# Patient Record
Sex: FEMALE | Race: WHITE | Employment: UNEMPLOYED | URBAN - METROPOLITAN AREA
[De-identification: names, ages, dates, MRNs, and addresses within clinical notes are randomized per-mention and may not be internally consistent; named-entity substitution may affect disease eponyms.]

---

## 2017-03-09 ENCOUNTER — OFFICE VISIT (OUTPATIENT)
Dept: FAMILY MEDICINE CLINIC | Age: 27
End: 2017-03-09

## 2017-03-09 VITALS
BODY MASS INDEX: 35.03 KG/M2 | WEIGHT: 218 LBS | RESPIRATION RATE: 16 BRPM | SYSTOLIC BLOOD PRESSURE: 129 MMHG | HEART RATE: 69 BPM | TEMPERATURE: 97.3 F | DIASTOLIC BLOOD PRESSURE: 94 MMHG | HEIGHT: 66 IN

## 2017-03-09 DIAGNOSIS — E66.9 OBESITY (BMI 30-39.9): Primary | ICD-10-CM

## 2017-03-09 DIAGNOSIS — R03.0 ELEVATED BLOOD PRESSURE (NOT HYPERTENSION): ICD-10-CM

## 2017-03-09 NOTE — MR AVS SNAPSHOT
Visit Information Date & Time Provider Department Dept. Phone Encounter #  
 3/9/2017  8:30 AM Anca QiuMajo 13 [id number] Follow-up Instructions Return in about 3 weeks (around 3/30/2017), or [facility] lab review. Follow-up and Disposition History Upcoming Health Maintenance Date Due INFLUENZA AGE 9 TO ADULT 8/1/2016 PAP AKA CERVICAL CYTOLOGY 9/4/2018 DTaP/Tdap/Td series (2 - Td) 5/2/2024 Allergies as of 3/9/2017  Review Complete On: 3/9/2017 By: Anca Qiu, DO No Known Allergies Current Immunizations  Reviewed on 3/9/2017 Name Date Tdap 5/2/2014 12:04 PM  
  
 Reviewed by Deuce Mccall LPN on 4/1/9184 at  1:12 AM  
You Were Diagnosed With   
  
 Codes Comments Obesity (BMI 30-39.9)    -  Primary ICD-10-CM: W31.2 ICD-9-CM: 278.00 Elevated blood pressure (not hypertension)     ICD-10-CM: R03.0 ICD-9-CM: 796.2 Vitals BP Pulse Temp Resp Height(growth percentile) Weight(growth percentile) (!) 129/94 69 97.3 °F (36.3 °C) (Oral) 16 5' 6\" (1.676 m) 218 lb (98.9 kg) BMI OB Status Smoking Status 35.19 kg/m2 Having regular periods Never Smoker Vitals History BMI and BSA Data Body Mass Index Body Surface Area  
 35.19 kg/m 2 2.15 m 2 Preferred Pharmacy Pharmacy Name Phone 180 [facility], 200 [facility] AT [address] [place] [address] [phone] Your Updated Medication List  
  
   
This list is accurate as of: 3/9/17 10:11 AM.  Always use your most recent med list.  
  
  
  
  
 ZYRTEC PO Take  by mouth. Follow-up Instructions Return in about 3 weeks (around 3/30/2017), or [facility] lab review. Patient Instructions Do this 5 times a day Take 5 Breaths Breathe in for a count of 5 Hold for a count of 5 Breathe out for a count of 5 Supplements Go to Socialtyze or Telarix and tet: Pradhan Starcher Rescue Remedy - 8 drops in 8 oz of water twice a day Theanine Serine by Source Naturals - 1 pill morning and night Magnesium citrate - 400 mg morning and night Books Me, Myself and I - 28 Days to Creative Self-Love by [facility] [place]) Body Mass Index: Care Instructions Your Care Instructions Body mass index (BMI) can help you see if your weight is raising your risk for health problems. It uses a formula to compare how much you weigh with how tall you are. A BMI between 18.5 and 24.9 is considered healthy. A BMI between 25 and 29.9 is considered overweight. A BMI of 30 or higher is considered obese. If your BMI is in the normal range, it means that you have a lower risk for weight-related health problems. If your BMI is in the overweight or obese range, you may be at increased risk for weight-related health problems, such as high blood pressure, heart disease, stroke, arthritis or joint pain, and diabetes. BMI is just one measure of your risk for weight-related health problems. You may be at higher risk for health problems if you are not active, you eat an unhealthy diet, or you drink too much alcohol or use tobacco products. Follow-up care is a key part of your treatment and safety. Be sure to make and go to all appointments, and call your doctor if you are having problems. It's also a good idea to know your test results and keep a list of the medicines you take. How can you care for yourself at home? · Practice healthy eating habits. This includes eating plenty of fruits, vegetables, whole grains, lean protein, and low-fat dairy. · Get at least 30 minutes of exercise 5 days a week or more. Brisk walking is a good choice. You also may want to do other activities, such as running, swimming, cycling, or playing tennis or team sports. · Do not smoke. Smoking can increase your risk for health problems.  If you need help quitting, talk to your doctor about stop-smoking programs and medicines. These can increase your chances of quitting for good. · Limit alcohol to 2 drinks a day for men and 1 drink a day for women. Too much alcohol can cause health problems. If you have a BMI higher than 25 · Your doctor may do other tests to check your risk for weight-related health problems. This may include measuring the distance around your waist. A waist measurement of more than 40 inches in men or 35 inches in women can increase the risk of weight-related health problems. · Talk with your doctor about steps you can take to stay healthy or improve your health. You may need to make lifestyle changes to lose weight and stay healthy, such as changing your diet and getting regular exercise. Where can you learn more? Go to http://zac-fatuma.info/. Enter S176 in the search box to learn more about \"Body Mass Index: Care Instructions. \" Current as of: February 16, 2016 Content Version: 11.1 © 1797-7306 Xtreme Power. Care instructions adapted under license by Kace Networks (which disclaims liability or warranty for this information). If you have questions about a medical condition or this instruction, always ask your healthcare professional. [place] [address] any warranty or liability for your use of this information. Patient Instructions History Introducing [facility] HE[facility]! Sada Gu introduces Health: Elt patient portal. Now you can access parts of your medical record, email your doctor's office, and request medication refills online. 1. In your internet browser, go to [email or web address] 2. Click on the First Time User? Click Here link in the Sign In box. You will see the New Member Sign Up page. 3. Enter your Health: Elt Access Code exactly as it appears below. You will not need to use this code after youve completed the sign-up process.  If you do not sign up before the expiration date, you must request a new code. · Doodle Mobile Access Code: [email or web address] Expires: 6/7/2017  8:33 AM 
 
4. Enter the last four digits of your Social Security Number (xxxx) and Date of Birth (mm/dd/yyyy) as indicated and click Submit. You will be taken to the next sign-up page. 5. Create a Doodle Mobile ID. This will be your Doodle Mobile login ID and cannot be changed, so think of one that is secure and easy to remember. 6. Create a Doodle Mobile password. You can change your password at any time. 7. Enter your Password Reset Question and Answer. This can be used at a later time if you forget your password. 8. Enter your e-mail address. You will receive e-mail notification when new information is available in [address] 9. Click Sign Up. You can now view and download portions of your medical record. 10. Click the Download Summary menu link to download a portable copy of your medical information. If you have questions, please visit the Frequently Asked Questions section of the Doodle Mobile website. Remember, Doodle Mobile is NOT to be used for urgent needs. For medical emergencies, dial 911. Now available from your iPhone and Android! Please provide this summary of care documentation to your next provider. Your primary care clinician is listed as Juan Persaud. If you have any questions after today's visit, please call [phone].

## 2017-03-09 NOTE — PATIENT INSTRUCTIONS
Do this 5 times a day  Take 5 Breaths  Breathe in for a count of 5  Hold for a count of 5  Breathe out for a count of 5    Supplements  Go to POTATOSOFT, Phizzbo or ConnectSoft and tet: Estela Mering Rescue Remedy - 8 drops in 8 oz of water twice a day  Theanine Serine by Source Naturals - 1 pill morning and night  Magnesium citrate - 400 mg morning and night      Books  Me, Myself and I - 28 Days to Creative Self-Love by Tanja Chakraborty ([employer])       Body Mass Index: Care Instructions  Your Care Instructions    Body mass index (BMI) can help you see if your weight is raising your risk for health problems. It uses a formula to compare how much you weigh with how tall you are. A BMI between 18.5 and 24.9 is considered healthy. A BMI between 25 and 29.9 is considered overweight. A BMI of 30 or higher is considered obese. If your BMI is in the normal range, it means that you have a lower risk for weight-related health problems. If your BMI is in the overweight or obese range, you may be at increased risk for weight-related health problems, such as high blood pressure, heart disease, stroke, arthritis or joint pain, and diabetes. BMI is just one measure of your risk for weight-related health problems. You may be at higher risk for health problems if you are not active, you eat an unhealthy diet, or you drink too much alcohol or use tobacco products. Follow-up care is a key part of your treatment and safety. Be sure to make and go to all appointments, and call your doctor if you are having problems. It's also a good idea to know your test results and keep a list of the medicines you take. How can you care for yourself at home? · Practice healthy eating habits. This includes eating plenty of fruits, vegetables, whole grains, lean protein, and low-fat dairy. · Get at least 30 minutes of exercise 5 days a week or more. Brisk walking is a good choice.  You also may want to do other activities, such as running, swimming, cycling, or playing tennis or team sports. · Do not smoke. Smoking can increase your risk for health problems. If you need help quitting, talk to your doctor about stop-smoking programs and medicines. These can increase your chances of quitting for good. · Limit alcohol to 2 drinks a day for men and 1 drink a day for women. Too much alcohol can cause health problems. If you have a BMI higher than 25  · Your doctor may do other tests to check your risk for weight-related health problems. This may include measuring the distance around your waist. A waist measurement of more than 40 inches in men or 35 inches in women can increase the risk of weight-related health problems. · Talk with your doctor about steps you can take to stay healthy or improve your health. You may need to make lifestyle changes to lose weight and stay healthy, such as changing your diet and getting regular exercise. Where can you learn more? Go to http://zac-fatuma.info/. Enter S176 in the search box to learn more about \"Body Mass Index: Care Instructions. \"  Current as of: February 16, 2016  Content Version: 11.1  © 6955-5731 HackerOne, Incorporated. Care instructions adapted under license by Agnitus (which disclaims liability or warranty for this information). If you have questions about a medical condition or this instruction, always ask your healthcare professional. [address] any warranty or liability for your use of this information.

## 2017-03-09 NOTE — PROGRESS NOTES
New Patient Visit    Cydney Prather presents today wanting to get a handle on preventing   -HTN  -Heart Dz  -Obesity    She goes to her OB regularly    Last visit to PCP ~18 months ago, lives [facility] [place] [facility] BP checked by a friend who's an EMT - high 120s over [de-identified] but previously up to upper 130s / 90s  She did have elevated BP while pregnant and was induced d/t elevated BP    Fam Hx  HTN  CAD - MGF  Obesity - M, D, Sister, MGF, PGM and aunts/uncles    Non-smoker  Non-drinker  Exercise   Off and on x several years to lose weight   Started back consistently in Aug    -Body weight exercises at home    -Running 3 miles 2 - 3 times a week  Diet   Shooting for lower fat, higher protein, carb    Weight   Improvement in her %BF    9/17/16 42.6% and 209.6   2/126/17 27.1% and 207.4    Heightst non-pregnant weight = 250 lb  Was around 155 in HS and very active  Would like to be around 170 lb (last time was ~20 yo)    Currently on Zyrtec (seasonal allergies) & daily MVM    No recent labs      Visit Vitals    BP (!) 129/94    Pulse 69    Temp 97.3 °F (36.3 °C) (Oral)    Resp 16    Ht 5' 6\" (1.676 m)    Wt 218 lb (98.9 kg)    BMI 35.19 kg/m2       Cydney Prather was seen today for weight management. Diagnoses and all orders for this visit:    Obesity (BMI 30-39. 9)    Elevated blood pressure (not hypertension)      Check THD labs    See patient instructions      15 minutes of the 30 minutes face to face time with Cydney Prather consisted of counseling & coordinating and/or discussing treatment plans in reference to her The primary encounter diagnosis was Obesity (BMI 30-39.9). A diagnosis of Elevated blood pressure (not hypertension) was also pertinent to this visit.

## 2017-03-17 LAB
% ALBUMIN, 58A: 61 % (ref 54–71)
25(OH)D3 SERPL-MCNC: 29 NG/ML (ref 30–100)
ALB/GLOBRATIO, 58C: 1.53 (ref 1.15–2.5)
ALBUMIN SERPL-MCNC: 4.4 G/DL (ref 3.7–5.1)
ALP SERPL-CCNC: 48 U/L (ref 35–125)
ALT SERPL-CCNC: 12 U/L
ANION GAP SERPL CALC-SCNC: 9 MMOL/L (ref 6–18)
APO B: APO A1 RATIO, 74: 0.51 (ref 0.61–0.8)
APOLIPOPROTEIN A-1, 6: 149 MG/DL
APOLIPOPROTEIN B , 48: 75 MG/DL
AST SERPL W P-5'-P-CCNC: 17 U/L (ref 5–32)
BILIRUB SERPL-MCNC: 1.2 MG/DL
BUN SERPL-MCNC: 14 MG/DL (ref 6–20)
BUN/CREATININE RATIO,BUCR: 17 (ref 10–27)
CALCIUM SERPL-MCNC: 9.4 MG/DL (ref 8.8–10.5)
CHLORIDE SERPL-SCNC: 103 MMOL/L (ref 98–110)
CHOLEST SERPL-MCNC: 167 MG/DL
CO2 SERPL-SCNC: 28 MMOL/L (ref 19–31)
CREAT SERPL-MCNC: 0.8 MG/DL (ref 0.5–0.9)
CRP SERPL HS-MCNC: 1.7 MG/L
ESTIMATED AVERAGE GLUCOSE, EAG: 96.8 MG/DL
FASTING-Y/N/HRS: ABNORMAL
FASTING-Y/N/HRS: NORMAL
FRUCTOSAMINE, 100808: 261.63 UMOL/L
GLOBCALC, 58B: 2.8 G/DL (ref 1.9–3.5)
GLUCOSE SERPL-MCNC: 82 MG/DL (ref 70–99)
HDL 2 SUBCLASS, 65: 22 MG/DL
HDLC SERPL-MCNC: 66 MG/DL
HGBA1C-T, HDL6300: 5 %
INSULIN,INS: 10 UU/ML (ref 3–9)
LDLC SERPL CALC-MCNC: 96 MG/DL
LP-PLA2, 123241: 200 NG/ML
Lab: -1.09
MPOAU: 252 PMOL/L
NON-HDL CHOLESTEROL, 011976: 101 MG/DL
POTASSIUM SERPL-SCNC: 4.3 MMOL/L (ref 3.5–5.3)
PROT SERPL-MCNC: 7.2 G/DL (ref 6.1–8)
SMALL DENSE LDL, 47: 18 MG/DL
SODIUM SERPL-SCNC: 140 MMOL/L (ref 133–145)
TRIGL SERPL-MCNC: 50 MG/DL (ref ?–150)
TSH SERPL-ACNC: 3.24 UIU/ML (ref 0.27–4.2)

## 2017-03-18 LAB
FASTING-Y/N/HRS: ABNORMAL
FASTING-Y/N/HRS: ABNORMAL
FASTING-Y/N/HRS: NORMAL
HDL HDL-P, HDL5001: 37.2 UMOL/L
HDL LDL-P, HDL5000: 828 NMOL/L
HDL SLDL-P, HDL5002: 277 NMOL/L
LEPTIN, SERUM, 146711: 60 NG/ML
LP(A)-P, HDL4000: < 50 NMOL/L
TRIG ALIAS-LP(A)-P: 49.6

## 2017-03-28 ENCOUNTER — OFFICE VISIT (OUTPATIENT)
Dept: FAMILY MEDICINE CLINIC | Age: 27
End: 2017-03-28

## 2017-03-28 VITALS
RESPIRATION RATE: 17 BRPM | BODY MASS INDEX: 34.07 KG/M2 | TEMPERATURE: 97.6 F | HEIGHT: 66 IN | HEART RATE: 71 BPM | SYSTOLIC BLOOD PRESSURE: 110 MMHG | DIASTOLIC BLOOD PRESSURE: 84 MMHG | WEIGHT: 212 LBS

## 2017-03-28 DIAGNOSIS — E66.9 OBESITY (BMI 30-39.9): Primary | ICD-10-CM

## 2017-03-28 DIAGNOSIS — R03.0 ELEVATED BLOOD PRESSURE (NOT HYPERTENSION): ICD-10-CM

## 2017-03-28 NOTE — MR AVS SNAPSHOT
Visit Information Date & Time Provider Department Dept. Phone Encounter #  
 3/28/2017  3:15 PM Alison Can, [address] [place] [address],[address] [phone] [id number] Follow-up Instructions Return in about 6 weeks (around 5/9/2017) for 2 slots - F/up. Chuck Hayward Upcoming Health Maintenance Date Due INFLUENZA AGE 9 TO ADULT 8/1/2016 PAP AKA CERVICAL CYTOLOGY 9/4/2018 DTaP/Tdap/Td series (2 - Td) 5/2/2024 Allergies as of 3/28/2017  Review Complete On: 3/28/2017 By: Alexander Santos LPN No Known Allergies Current Immunizations  Reviewed on 3/28/2017 Name Date Tdap 5/2/2014 12:04 PM  
  
 Reviewed by Alexander Santos LPN on 5/97/5453 at  3:16 PM  
You Were Diagnosed With   
  
 Codes Comments Obesity (BMI 30-39.9)    -  Primary ICD-10-CM: L66.8 ICD-9-CM: 278.00 Elevated blood pressure (not hypertension)     ICD-10-CM: R03.0 ICD-9-CM: 796.2 Vitals BP Pulse Temp Resp Height(growth percentile) Weight(growth percentile) 110/84 71 97.6 °F (36.4 °C) (Oral) 17 5' 6\" (1.676 m) 212 lb (96.2 kg) LMP BMI OB Status Smoking Status 03/20/2017 34.22 kg/m2 Having regular periods Never Smoker Vitals History BMI and BSA Data Body Mass Index Body Surface Area  
 34.22 kg/m 2 2.12 m 2 Preferred Pharmacy Pharmacy Name Phone 180 [facility], 200 [facility] AT [address] [place] [address] [phone] Your Updated Medication List  
  
   
This list is accurate as of: 3/28/17  4:45 PM.  Always use your most recent med list.  
  
  
  
  
 ZYRTEC PO Take  by mouth. Follow-up Instructions Return in about 6 weeks (around 5/9/2017) for 2 slots - F/up. Chuck Hayward Patient Instructions 1. Source Naturals Women's Multi Vitamin 3 a day 2.  Turmeric with Black Pepper Extract (or Bioperine) 500  mg 
 1 pill 1 time a day (more is joint pain or increased inflammation) 3. Vitamin Shoppe: Ultimate 10 Probiotic 30 billion CFU 
    1 capsule daily 4. Fish Oil Take 1 capsule daily Books 1. Me, Myself and I - 28 Days to Creative Self-Love 
by Helen Jordan version only Before book #2 YouTube: EFT and stress 2. Mindless Eating 
by Julisa Jonas, PhD 
 
3. Change Anything 
by Bruce Starks, Pablo Lugo, and Deidra Yeung 4. Perfectly Yourself 
by Arelis Harris If you want to get a better picture of your cardiac risk, consider getting a coronary calcium score:  Call [phone] to schedule one. Introducing [facility]! Dear Kristal Carrillo: 
Thank you for requesting a Poken account. Our records indicate that you already have an active Poken account. You can access your account anytime at [email or web address] Did you know that you can access your hospital and ER discharge instructions at any time in Poken? You can also review all of your test results from your hospital stay or ER visit. Additional Information If you have questions, please visit the Frequently Asked Questions section of the Poken website at [email or web address] Remember, Poken is NOT to be used for urgent needs. For medical emergencies, dial 911. Now available from your iPhone and Android! Please provide this summary of care documentation to your next provider. Your primary care clinician is listed as Juan Persaud. If you have any questions after today's visit, please call [phone].

## 2017-03-28 NOTE — PROGRESS NOTES
Taking Theanine Serine twice a day  Taking Bach Rescue Remedy twice a day  Taking magnesium citrate 800 mg at morenita    Sleeping better  Less anxious  Wt down 6 lb  Less cravings    BP Readings from Last 2 Encounters:   03/28/17 110/84   03/09/17 (!) 129/94     Wt Readings from Last 2 Encounters:   03/28/17 212 lb (96.2 kg)   03/09/17 218 lb (98.9 kg)       Review of Systems   Constitutional: Negative. Physical Exam   Constitutional: She is oriented to person, place, and time. She appears well-developed and well-nourished. /84  Pulse 71  Temp 97.6 °F (36.4 °C) (Oral)   Resp 17  Ht 5' 6\" (1.676 m)  Wt 212 lb (96.2 kg)  LMP 03/20/2017  BMI 34.22 kg/m2     HENT:   Head: Normocephalic and atraumatic. Cardiovascular: Normal rate. Pulmonary/Chest: Effort normal.   Neurological: She is alert and oriented to person, place, and time. Skin: Skin is warm and dry. Psychiatric: She has a normal mood and affect. Her behavior is normal.   Nursing note and vitals reviewed. 20 minutes of the 25 minutes face to face time with Cydney Prather consisted of counseling & coordinating and/or discussing treatment plans in reference to her The primary encounter diagnosis was Obesity (BMI 30-39.9). A diagnosis of Elevated blood pressure (not hypertension) was also pertinent to this visit.

## 2017-03-28 NOTE — PATIENT INSTRUCTIONS
1. Source Naturals Women's Multi Vitamin       3 a day    2. Turmeric with Black Pepper Extract (or Bioperine) 500  mg      1 pill 1 time a day (more is joint pain or increased inflammation)    3. Vitamin Shoppe: Ultimate 10 Probiotic 30 billion CFU      1 capsule daily     4. Fish Oil      Take 1 capsule daily         Books    1. Me, Myself and I - 28 Days to Creative Self-Love  by Barrie Casey version only    Before book #2 YouTube: EFT and stress    2. Mindless Eating  by Leonor Garrison, PhD    3. Change Anything  by Shena Painting, Natividad Cheung, and Aliyah Murry    4. Perfectly Yourself  by Juan Kendall      If you want to get a better picture of your cardiac risk, consider getting a coronary calcium score:  Call [phone] to schedule one.

## 2017-05-04 ENCOUNTER — OFFICE VISIT (OUTPATIENT)
Dept: FAMILY MEDICINE CLINIC | Age: 27
End: 2017-05-04

## 2017-05-04 VITALS
RESPIRATION RATE: 17 BRPM | HEART RATE: 78 BPM | SYSTOLIC BLOOD PRESSURE: 110 MMHG | TEMPERATURE: 97 F | HEIGHT: 66 IN | DIASTOLIC BLOOD PRESSURE: 81 MMHG | WEIGHT: 212 LBS | BODY MASS INDEX: 34.07 KG/M2

## 2017-05-04 DIAGNOSIS — E66.9 OBESITY (BMI 30-39.9): Primary | ICD-10-CM

## 2017-05-04 DIAGNOSIS — R03.0 ELEVATED BLOOD PRESSURE READING WITHOUT DIAGNOSIS OF HYPERTENSION: ICD-10-CM

## 2017-05-04 NOTE — MR AVS SNAPSHOT
Visit Information Date & Time Provider Department Dept. Phone Encounter #  
 5/4/2017  2:00 PM Kavon Lewis [id number] Follow-up Instructions Return in about 4 weeks (around 6/1/2017). Follow-up and Disposition History Upcoming Health Maintenance Date Due INFLUENZA AGE 9 TO ADULT 8/1/2017 PAP AKA CERVICAL CYTOLOGY 9/4/2018 DTaP/Tdap/Td series (2 - Td) 5/2/2024 Allergies as of 5/4/2017  Review Complete On: 5/4/2017 By: Iraida Jacob, DO No Known Allergies Current Immunizations  Reviewed on 5/4/2017 Name Date Tdap 5/2/2014 12:04 PM  
  
 Reviewed by Yonathan Casas LPN on 6/2/2988 at  4:78 PM  
You Were Diagnosed With   
  
 Codes Comments Obesity (BMI 30-39.9)    -  Primary ICD-10-CM: Y04.4 ICD-9-CM: 278.00 Elevated blood pressure reading without diagnosis of hypertension     ICD-10-CM: R03.0 ICD-9-CM: 796.2 Vitals BP Pulse Temp Resp Height(growth percentile) Weight(growth percentile) 110/81 78 97 °F (36.1 °C) (Oral) 17 5' 6\" (1.676 m) 212 lb (96.2 kg) LMP BMI OB Status Smoking Status 04/06/2017 34.22 kg/m2 Having regular periods Never Smoker Vitals History BMI and BSA Data Body Mass Index Body Surface Area  
 34.22 kg/m 2 2.12 m 2 Preferred Pharmacy Pharmacy Name Phone 180 [facility], 200 [facility] AT [address] [place] [address] [phone] Your Updated Medication List  
  
   
This list is accurate as of: 5/4/17  2:53 PM.  Always use your most recent med list.  
  
  
  
  
 ZYRTEC PO Take  by mouth. Follow-up Instructions Return in about 4 weeks (around 6/1/2017). Patient Instructions Add Juan Dadds Remedy: 
Crab Apple same as the rescue remedy Keep working on the exercises in [employer]. Start keep up with other ways to check on your progress: -Body measurements 
-How long you can workout 
-Your strength Learning About Low-Carbohydrate Diets for Weight Loss What is a low-carbohydrate diet? Low-carb diets avoid foods that are high in carbohydrate. These high-carb foods include pasta, bread, rice, cereal, fruits, and starchy vegetables. Instead, these diets usually have you eat foods that are high in fat and protein. Many people lose weight quickly on a low-carb diet. But the early weight loss is water. People on this diet often gain the weight back after they start eating carbs again. Not all diet plans are safe or work well. A lot of the evidence shows that low-carb diets aren't healthy. That's because these diets often don't include healthy foods like fruits and vegetables. Losing weight safely means balancing protein, fat, and carbs with every meal and snack. And low-carb diets don't always provide the vitamins, minerals, and fiber you need. If you have a serious medical condition, talk to your doctor before you try any diet. These conditions include kidney disease, heart disease, type 2 diabetes, high cholesterol, and high blood pressure. If you are pregnant, it may not be safe for your baby if you are on a low-carb diet. How can you lose weight safely? You might have heard that a diet plan helped another person lose weight. But that doesn't mean that it will work for you. It is very hard to stay on a diet that includes lots of big changes in your eating habits. If you want to get to a healthy weight and stay there, making healthy lifestyle changes will often work better than dieting. These steps can help. · Make a plan for change. Work with your doctor to create a plan that is right for you. · See a dietitian. He or she can show you how to make healthy changes in your eating habits. · Manage stress. If you have a lot of stress in your life, it can be hard to focus on making healthy changes to your daily habits. · Track your food and activity. You are likely to do better at losing weight if you keep track of what you eat and what you do. Follow-up care is a key part of your treatment and safety. Be sure to make and go to all appointments, and call your doctor if you are having problems. It's also a good idea to know your test results and keep a list of the medicines you take. Where can you learn more? Go to http://zac-fatuma.info/. Enter A121 in the search box to learn more about \"Learning About Low-Carbohydrate Diets for Weight Loss. \" Current as of: December 8, 2016 Content Version: 11.2 © 9726-2474 [email or web address]. Care instructions adapted under license by EyeQuant (which disclaims liability or warranty for this information). If you have questions about a medical condition or this instruction, always ask your healthcare professional. [address] any warranty or liability for your use of this information. Patient Instructions History Introducing [facility] & HE[facility] SERVICES! Dear Yasmine Prom: 
Thank you for requesting a Degordian account. Our records indicate that you already have an active Degordian account. You can access your account anytime at [email or web address] Did you know that you can access your hospital and ER discharge instructions at any time in Degordian? You can also review all of your test results from your hospital stay or ER visit. Additional Information If you have questions, please visit the Frequently Asked Questions section of the Degordian website at [email or web address] Remember, Degordian is NOT to be used for urgent needs. For medical emergencies, dial 911. Now available from your iPhone and Android! Please provide this summary of care documentation to your next provider. Your primary care clinician is listed as Juan Persaud.  If you have any questions after today's visit, please call [phone].

## 2017-05-04 NOTE — PATIENT INSTRUCTIONS
Add Wilber Waller Remedy:  Crab Apple same as the rescue remedy    Keep working on the exercises in [employer]. Start keep up with other ways to check on your progress:  -Body measurements  -How long you can workout  -Your strength         Learning About Low-Carbohydrate Diets for Weight Loss  What is a low-carbohydrate diet? Low-carb diets avoid foods that are high in carbohydrate. These high-carb foods include pasta, bread, rice, cereal, fruits, and starchy vegetables. Instead, these diets usually have you eat foods that are high in fat and protein. Many people lose weight quickly on a low-carb diet. But the early weight loss is water. People on this diet often gain the weight back after they start eating carbs again. Not all diet plans are safe or work well. A lot of the evidence shows that low-carb diets aren't healthy. That's because these diets often don't include healthy foods like fruits and vegetables. Losing weight safely means balancing protein, fat, and carbs with every meal and snack. And low-carb diets don't always provide the vitamins, minerals, and fiber you need. If you have a serious medical condition, talk to your doctor before you try any diet. These conditions include kidney disease, heart disease, type 2 diabetes, high cholesterol, and high blood pressure. If you are pregnant, it may not be safe for your baby if you are on a low-carb diet. How can you lose weight safely? You might have heard that a diet plan helped another person lose weight. But that doesn't mean that it will work for you. It is very hard to stay on a diet that includes lots of big changes in your eating habits. If you want to get to a healthy weight and stay there, making healthy lifestyle changes will often work better than dieting. These steps can help. · Make a plan for change. Work with your doctor to create a plan that is right for you. · See a dietitian.  He or she can show you how to make healthy changes in your eating habits. · Manage stress. If you have a lot of stress in your life, it can be hard to focus on making healthy changes to your daily habits. · Track your food and activity. You are likely to do better at losing weight if you keep track of what you eat and what you do. Follow-up care is a key part of your treatment and safety. Be sure to make and go to all appointments, and call your doctor if you are having problems. It's also a good idea to know your test results and keep a list of the medicines you take. Where can you learn more? Go to http://zac-fatuma.info/. Enter A121 in the search box to learn more about \"Learning About Low-Carbohydrate Diets for Weight Loss. \"  Current as of: December 8, 2016  Content Version: 11.2  © 4746-2248 [employer]. Care instructions adapted under license by [employer] (which disclaims liability or warranty for this information). If you have questions about a medical condition or this instruction, always ask your healthcare professional. [address] any warranty or liability for your use of this information.

## 2017-05-04 NOTE — PROGRESS NOTES
1. Have you been to the ER, urgent care clinic since your last visit? Hospitalized since your last visit? No    2. Have you seen or consulted any other health care providers outside of the [address] [place] [address] since your last visit? Include any pap smears or colon screening.  No

## 2017-05-04 NOTE — PROGRESS NOTES
F/up appointment     Weight  Cravings better  Diet better - not being drawn to eating as much when around comfort food  Working out more  Cardio 30 min 3 days a week  Strength is improving     Wt Readings from Last 3 Encounters:   05/04/17 212 lb (96.2 kg)   03/28/17 212 lb (96.2 kg)   03/09/17 218 lb (98.9 kg)       Anxiety  Taking Theanine Serine twice a day  Taking Bach Rescue Remedy twice a day  Taking magnesium citrate 800 mg at [facility]ita  Still sleeping better      BP improved  BP Readings from Last 3 Encounters:   05/04/17 110/81   03/28/17 110/84   03/09/17 (!) 129/94         Visit Vitals    /81    Pulse 78    Temp 97 °F (36.1 °C) (Oral)    Resp 17    Ht 5' 6\" (1.676 m)    Wt 212 lb (96.2 kg)    LMP 04/06/2017    BMI 34.22 kg/m2          10 minutes of the 15 minutes face to face time with Cary Narayanan consisted of counseling & coordinating and/or discussing treatment plans in reference to her The primary encounter diagnosis was Obesity (BMI 30-39.9).  A diagnosis of Elevated blood pressure (not hypertension) was also pertinent to this visit.

## 2018-08-09 ENCOUNTER — OFFICE VISIT (OUTPATIENT)
Dept: OBGYN CLINIC | Age: 28
End: 2018-08-09

## 2018-08-09 VITALS
WEIGHT: 215 LBS | HEIGHT: 66 IN | SYSTOLIC BLOOD PRESSURE: 132 MMHG | HEART RATE: 63 BPM | DIASTOLIC BLOOD PRESSURE: 82 MMHG | BODY MASS INDEX: 34.55 KG/M2

## 2018-08-09 DIAGNOSIS — N92.6 MISSED MENSES: Primary | ICD-10-CM

## 2018-08-09 NOTE — PROGRESS NOTES
34 y/o  presents to the office for missed menses. She has no concerns, but is pregnant and here for confirmation. She has nausea, but denies vomiting, bleeding or pain. Visit Vitals    /82    Pulse 63    Ht 5' 6\" (1.676 m)    Wt 215 lb (97.5 kg)    LMP 2018 (Exact Date)    BMI 34.7 kg/m2     Gen: A&OX3, NAD  Exam deferred    Ultrasound: single IUP @ 8w6d; +FCA    A/P:   Early IUP. Start PNV  RTO 2 weeks.

## 2018-08-23 ENCOUNTER — ROUTINE PRENATAL (OUTPATIENT)
Dept: OBGYN CLINIC | Age: 28
End: 2018-08-23

## 2018-08-23 ENCOUNTER — HOSPITAL ENCOUNTER (OUTPATIENT)
Dept: LAB | Age: 28
Discharge: HOME | End: 2018-08-23

## 2018-08-23 VITALS
WEIGHT: 216.4 LBS | BODY MASS INDEX: 34.78 KG/M2 | SYSTOLIC BLOOD PRESSURE: 118 MMHG | HEART RATE: 67 BPM | HEIGHT: 66 IN | DIASTOLIC BLOOD PRESSURE: 79 MMHG | RESPIRATION RATE: 18 BRPM | OXYGEN SATURATION: 100 %

## 2018-08-23 DIAGNOSIS — Z3A.10 10 WEEKS GESTATION OF PREGNANCY: ICD-10-CM

## 2018-08-23 DIAGNOSIS — Z34.81 ENCOUNTER FOR SUPERVISION OF OTHER NORMAL PREGNANCY, FIRST TRIMESTER: ICD-10-CM

## 2018-08-23 DIAGNOSIS — O34.219 PREVIOUS CESAREAN DELIVERY AFFECTING PREGNANCY: Primary | ICD-10-CM

## 2018-08-23 LAB
HBSAG, EXTERNAL: NORMAL
HIV, EXTERNAL: NORMAL
N. GONORRHEA, EXTERNAL: NORMAL
RPR, EXTERNAL: NORMAL
RUBELLA, EXTERNAL: NORMAL

## 2018-08-23 PROCEDURE — 99001 SPECIMEN HANDLING PT-LAB: CPT | Performed by: SPECIALIST

## 2018-08-23 NOTE — PROGRESS NOTES
Jeremy Navarro    Ms. Madi Orr presents today for her first prenatal visit. OB History      Para Term  AB Living    3 2 2   2    SAB TAB Ectopic Molar Multiple Live Births         2        I have reviewed the patient's medical, obstetrical, social, and family histories, medications, and available lab results. Subjective:   She has no unusual complaints    Objective:   Initial Physical Exam (New OB)    GENERAL APPEARANCE: alert, well appearing, in no apparent distress  HEAD: normocephalic, atraumatic  MOUTH: mucous membranes moist, pharynx normal without lesions  THYROID: no thyromegaly or masses present  BREASTS: no masses noted, no significant tenderness, no palpable axillary nodes, no skin changes  LUNGS: clear to auscultation, no wheezes, rales or rhonchi, symmetric air entry  HEART: regular rate and rhythm, no murmurs  ABDOMEN: soft, nontender, nondistended, no abnormal masses, no epigastric pain  EXTREMITIES: no redness or tenderness in the calves or thighs, no edema  SKIN: normal coloration and turgor, no rashes  LYMPH NODES: no adenopathy palpable  NEUROLOGIC: alert, oriented, normal speech, no focal findings or movement disorder noted    PELVIC EXAM: EXTERNAL GENITALIA: normal appearing vulva with no masses, tenderness or lesions  VAGINA: no abnormal discharge or lesions  CERVIX: no lesions or cervical motion tenderness  UTERUS: gravid  ADNEXA: no masses palpable and nontender    Assessment/Plan:   Normal pregnancy    Routine Prenatal care    ICD-10-CM ICD-9-CM    1.  Previous  delivery affecting pregnancy O34.219 654.20 PNV66-Iron Fumarate-FA-DSS-DHA 26-1.2- mg cap      CULTURE, URINE      HEMOGLOBIN FRACTIONATION      T PALLIDUM AB      RUBELLA AB, IGG      CBC WITH AUTOMATED DIFF      HEP B SURFACE AG      HIV 1/2 AG/AB, 4TH GENERATION,W RFLX CONFIRM      TYPE & SCREEN      PAP IG, CT-NG-TV, RFX APTIMA HPV ASCUS ([id number])      CULTURE, URINE      HEMOGLOBIN FRACTIONATION      T PALLIDUM AB      RUBELLA AB, IGG      CBC WITH AUTOMATED DIFF      HEP B SURFACE AG      HIV 1/2 AG/AB, 4TH GENERATION,W RFLX CONFIRM      TYPE & SCREEN   2. Encounter for supervision of other normal pregnancy, first trimester Z34.81 V22.1 PNV66-Iron Fumarate-FA-DSS-DHA 26-1.2- mg cap      CULTURE, URINE      HEMOGLOBIN FRACTIONATION      T PALLIDUM AB      RUBELLA AB, IGG      CBC WITH AUTOMATED DIFF      HEP B SURFACE AG      HIV 1/2 AG/AB, 4TH GENERATION,W RFLX CONFIRM      TYPE & SCREEN      PAP IG, CT-NG-TV, RFX APTIMA HPV ASCUS ([id number])      CULTURE, URINE      HEMOGLOBIN FRACTIONATION      T PALLIDUM AB      RUBELLA AB, IGG      CBC WITH AUTOMATED DIFF      HEP B SURFACE AG      HIV 1/2 AG/AB, 4TH GENERATION,W RFLX CONFIRM      TYPE & SCREEN   3. 10 weeks gestation of pregnancy Z3A.10 V22.2 PNV66-Iron Fumarate-FA-DSS-DHA 26-1.2- mg cap      CULTURE, URINE      HEMOGLOBIN FRACTIONATION      T PALLIDUM AB      RUBELLA AB, IGG      CBC WITH AUTOMATED DIFF      HEP B SURFACE AG      HIV 1/2 AG/AB, 4TH GENERATION,W RFLX CONFIRM      TYPE & SCREEN      PAP IG, CT-NG-TV, RFX APTIMA HPV ASCUS ([id number])      CULTURE, URINE      HEMOGLOBIN FRACTIONATION      T PALLIDUM AB      RUBELLA AB, IGG      CBC WITH AUTOMATED DIFF      HEP B SURFACE AG      HIV 1/2 AG/AB, 4TH GENERATION,W RFLX CONFIRM      TYPE & SCREEN     Encounter Diagnoses   Name Primary?  Previous  delivery affecting pregnancy Yes    Encounter for supervision of other normal pregnancy, first trimester     10 weeks gestation of pregnancy      Orders Placed This Encounter    CULTURE, URINE    HEMOGLOBIN FRACTIONATION    T PALLIDUM AB    RUBELLA AB, IGG    CBC WITH AUTOMATED DIFF    HEP B SURFACE AG    HIV SCREEN, 4TH GEN.  W/REFLEX CONFIRM    TYPE & SCREEN    PNV66-Iron Fumarate-FA-DSS-DHA 26-1.2- mg cap    PAP IG, CT-NG-TV, RFX APTIMA HPV ASCUS ([id number])     Follow-up Disposition:  Return in about 4 weeks (around 9/20/2018) for Routine OB Visit.

## 2018-08-23 NOTE — MR AVS SNAPSHOT
[address] [facility] [address] [address] 
[phone] Patient: Misty García MRN: [id number] Visit Information Date & Time Provider Department Dept. Phone Encounter #  
 2018 11:00 AM Jacqui Paulino [facility] [phone] [id number] Follow-up Instructions Return in about 4 weeks (around 2018) for Routine OB Visit. 2018  8:30 AM  
OB VISIT with Jacqui Paulino MD  
[address] [place] [address]) Appt Note: ob  
 [facility] [address] [phone]  
[phone]  
  
   
 [facility] [phone] Upcoming Health Maintenance Date Due Influenza Age 5 to Adult 2018 PAP AKA CERVICAL CYTOLOGY 2018 Allergies as of 2018  Review Complete On: 2018 By: Marcia Bray LPN No Known Allergies Current Immunizations  Reviewed on 2017 Name Date Tdap 2014 12:04 PM  
  
 Not reviewed this visit You Were Diagnosed With   
  
 Codes Comments Previous  delivery affecting pregnancy    -  Primary ICD-10-CM: O34.219 ICD-9-CM: 654.20 Encounter for supervision of other normal pregnancy, first trimester     ICD-10-CM: Z34.81 ICD-9-CM: V22.1 10 weeks gestation of pregnancy     ICD-10-CM: Z3A.10 ICD-9-CM: V22.2 Vitals Resp Height(growth percentile) Weight(growth percentile) LMP SpO2 BMI  
 18 5' 6\" (1.676 m) 216 lb 6.4 oz (98.2 kg) 2018 (Exact Date) 100% 34.93 kg/m2 OB Status Smoking Status Pregnant Never Smoker BMI and BSA Data Body Mass Index Body Surface Area 34.93 kg/m 2 2.14 m 2 Preferred Pharmacy Pharmacy Name Phone [address] [facility], [address] [facility] AT [address] [place] [address] [phone] Your Updated Medication List  
  
   
 This list is accurate as of 8/23/18 12:02 PM.  Always use your most recent med list.  
  
  
  
  
 PNV66-Iron Fumarate-FA-DSS-DHA 26-1.2- mg Cap Take  by mouth. ZYRTEC PO Take  by mouth. We Performed the Following CBC WITH AUTOMATED DIFF [82914 CPT(R)] CULTURE, URINE [id number] CPT(R)] HEMOGLOBIN FRACTIONATION [[id number] Custom] HEP B SURFACE AG [id number] CPT(R)] HIV 1/2 AG/AB, 4TH GENERATION,W RFLX CONFIRM [id number] CPT(R)] RUBELLA AB, IGG [id number] CPT(R)] T PALLIDUM AB [[id number] Custom] TYPE & SCREEN [[id number] Custom] Follow-up Instructions Return in about 4 weeks (around 9/20/2018) for Routine OB Visit. To-Do List   
 08/23/2018 Lab:  CBC WITH AUTOMATED DIFF   
  
 08/23/2018 Microbiology:  CULTURE, URINE   
  
 08/23/2018 Lab:  HEMOGLOBIN FRACTIONATION   
  
 08/23/2018 Lab:  HEP B SURFACE AG   
  
 08/23/2018 Lab:  HIV 1/2 AG/AB, 4TH GENERATION,W RFLX CONFIRM   
  
 08/23/2018 Pathology:  PAP IG, CT-NG-TV, RFX APTIMA HPV ASCUS ([id number])   
  
 08/23/2018 Lab:  RUBELLA AB, IGG   
  
 08/23/2018 Lab:  T PALLIDUM AB   
  
 08/23/2018 Blood Bank:  TYPE & SCREEN Introducing [facility]! Dear Jose Melton: 
Thank you for requesting a VitaPortal account. Our records indicate that you already have an active VitaPortal account. You can access your account anytime at [email or web address]. [email or web address] Did you know that you can access your hospital and ER discharge instructions at any time in VitaPortal? You can also review all of your test results from your hospital stay or ER visit. Additional Information If you have questions, please visit the Frequently Asked Questions section of the VitaPortal website at [email or web address] Remember, VitaPortal is NOT to be used for urgent needs. For medical emergencies, dial 911. Now available from your iPhone and Android! Please provide this summary of care documentation to your next provider. Your primary care clinician is listed as Jarek Cary. If you have any questions after today's visit, please call [phone].

## 2018-08-23 NOTE — PROGRESS NOTES
Visit Vitals    Resp 18    Ht 5' 6\" (1.676 m)    Wt 216 lb 6.4 oz (98.2 kg)    LMP 06/09/2018 (Exact Date)    SpO2 100%    BMI 34.93 kg/m2     Patient here for Prenatal intake exam. No complaints or issues

## 2018-08-25 LAB
C TRACH RRNA CVX QL NAA+PROBE: NEGATIVE
CYTOLOGIST CVX/VAG CYTO: NORMAL
CYTOLOGY CVX/VAG DOC THIN PREP: NORMAL
DX ICD CODE: NORMAL
LABCORP, 190119: NORMAL
Lab: NORMAL
N GONORRHOEA RRNA CVX QL NAA+PROBE: NEGATIVE
OTHER STN SPEC: NORMAL
PATH REPORT.FINAL DX SPEC: NORMAL
STAT OF ADQ CVX/VAG CYTO-IMP: NORMAL
T VAGINALIS RRNA SPEC QL NAA+PROBE: NEGATIVE

## 2018-08-27 LAB
ABO GROUP BLD: NORMAL
BACTERIA UR CULT: NORMAL
BASOPHILS # BLD AUTO: 0 X10E3/UL (ref 0–0.2)
BASOPHILS NFR BLD AUTO: 0 %
BLD GP AB SCN SERPL QL: NEGATIVE
EOSINOPHIL # BLD AUTO: 0.1 X10E3/UL (ref 0–0.4)
EOSINOPHIL NFR BLD AUTO: 2 %
ERYTHROCYTE [DISTWIDTH] IN BLOOD BY AUTOMATED COUNT: 12.9 % (ref 12.3–15.4)
HBV SURFACE AG SERPL QL IA: NEGATIVE
HCT VFR BLD AUTO: 36 % (ref 34–46.6)
HGB A MFR BLD: 97.7 % (ref 96.4–98.8)
HGB A2 MFR BLD COLUMN CHROM: 2.3 % (ref 1.8–3.2)
HGB BLD-MCNC: 12.5 G/DL (ref 11.1–15.9)
HGB C MFR BLD: 0 %
HGB F MFR BLD: 0 % (ref 0–2)
HGB FRACT BLD-IMP: NORMAL
HGB OTHER MFR BLD HPLC: 0 %
HGB S BLD QL SOLY: NEGATIVE
HGB S MFR BLD: 0 %
HIV 1+2 AB+HIV1 P24 AG SERPL QL IA: NON REACTIVE
IMM GRANULOCYTES # BLD: 0 X10E3/UL (ref 0–0.1)
IMM GRANULOCYTES NFR BLD: 0 %
LYMPHOCYTES # BLD AUTO: 2.1 X10E3/UL (ref 0.7–3.1)
LYMPHOCYTES NFR BLD AUTO: 26 %
MCH RBC QN AUTO: 30.9 PG (ref 26.6–33)
MCHC RBC AUTO-ENTMCNC: 34.7 G/DL (ref 31.5–35.7)
MCV RBC AUTO: 89 FL (ref 79–97)
MONOCYTES # BLD AUTO: 0.6 X10E3/UL (ref 0.1–0.9)
MONOCYTES NFR BLD AUTO: 7 %
NEUTROPHILS # BLD AUTO: 5 X10E3/UL (ref 1.4–7)
NEUTROPHILS NFR BLD AUTO: 65 %
PLATELET # BLD AUTO: 202 X10E3/UL (ref 150–379)
RBC # BLD AUTO: 4.04 X10E6/UL (ref 3.77–5.28)
RH BLD: POSITIVE
RUBV IGG SERPL IA-ACNC: 2.13 INDEX
T PALLIDUM AB SER QL IA: NEGATIVE
WBC # BLD AUTO: 7.8 X10E3/UL (ref 3.4–10.8)

## 2018-09-21 ENCOUNTER — ROUTINE PRENATAL (OUTPATIENT)
Dept: OBGYN CLINIC | Age: 28
End: 2018-09-21

## 2018-09-21 VITALS
SYSTOLIC BLOOD PRESSURE: 131 MMHG | RESPIRATION RATE: 18 BRPM | BODY MASS INDEX: 34.39 KG/M2 | WEIGHT: 214 LBS | HEIGHT: 66 IN | HEART RATE: 76 BPM | DIASTOLIC BLOOD PRESSURE: 95 MMHG | OXYGEN SATURATION: 100 %

## 2018-09-21 DIAGNOSIS — Z34.82 ENCOUNTER FOR SUPERVISION OF OTHER NORMAL PREGNANCY, SECOND TRIMESTER: ICD-10-CM

## 2018-09-21 DIAGNOSIS — O10.919 ESSENTIAL HYPERTENSION IN PREGNANCY: ICD-10-CM

## 2018-09-21 DIAGNOSIS — Z3A.15 15 WEEKS GESTATION OF PREGNANCY: Primary | ICD-10-CM

## 2018-09-21 LAB
BILIRUB UR QL STRIP: NEGATIVE
GLUCOSE UR-MCNC: NEGATIVE MG/DL
KETONES P FAST UR STRIP-MCNC: NEGATIVE MG/DL
PH UR STRIP: 8.5 [PH] (ref 4.6–8)
PROT UR QL STRIP: NEGATIVE
SP GR UR STRIP: 1.02 (ref 1–1.03)
UA UROBILINOGEN AMB POC: ABNORMAL (ref 0.2–1)
URINALYSIS CLARITY POC: CLEAR
URINALYSIS COLOR POC: YELLOW
URINE BLOOD POC: NEGATIVE
URINE LEUKOCYTES POC: ABNORMAL
URINE NITRITES POC: NEGATIVE

## 2018-09-21 RX ORDER — LABETALOL 100 MG/1
100 TABLET, FILM COATED ORAL 2 TIMES DAILY
Qty: 60 TAB | Refills: 5 | Status: SHIPPED | OUTPATIENT
Start: 2018-09-21 | End: 2019-03-14

## 2018-09-21 NOTE — MR AVS SNAPSHOT
[address] [facility] 
 
 
 [address] [facility] [address] [address] 
[phone] Patient: Essie Daniel MRN: [id number] EEZ:[id number] Visit Information Date & Time Provider Department Dept. Phone Encounter #  
 9/21/2018  8:30 AM Jos Sagastume, [facility] [phone] [id number] Follow-up Instructions Return in about 4 weeks (around 10/19/2018) for Routine OB visit WITH U/S WITH MRS. KAN. Upcoming Health Maintenance Date Due Influenza Age 5 to Adult 8/1/2018 PAP AKA CERVICAL CYTOLOGY 8/23/2021 Allergies as of 9/21/2018  Review Complete On: 9/21/2018 By: Jos Sagastume MD  
 No Known Allergies Current Immunizations  Reviewed on 5/4/2017 Name Date Tdap 5/2/2014 12:04 PM  
  
 Not reviewed this visit You Were Diagnosed With   
  
 Codes Comments 15 weeks gestation of pregnancy    -  Primary ICD-10-CM: Z3A.15 
ICD-9-CM: V22.2 Encounter for supervision of other normal pregnancy, second trimester     ICD-10-CM: Z34.82 
ICD-9-CM: V22.1 Essential hypertension in pregnancy     ICD-10-CM: O10.019 ICD-9-CM: 642.00 Vitals BP Pulse Resp Height(growth percentile) Weight(growth percentile) LMP  
 (!) 131/95 (BP 1 Location: Left arm, BP Patient Position: Sitting) 76 18 5' 6\" (1.676 m) 214 lb (97.1 kg) 06/09/2018 (Exact Date) SpO2 BMI OB Status Smoking Status 100% 34.54 kg/m2 Pregnant Never Smoker BMI and BSA Data Body Mass Index Body Surface Area 34.54 kg/m 2 2.13 m 2 Preferred Pharmacy Pharmacy Name Phone [address] [facility], 200 [facility] AT [address] [place] [address] [phone] Your Updated Medication List  
  
   
This list is accurate as of 9/21/18  9:02 AM.  Always use your most recent med list.  
  
  
  
  
 labetalol 100 mg tablet Commonly known as:  Trang Worley Take 1 Tab by mouth two (2) times a day. PNV66-Iron Fumarate-FA-DSS-DHA 26-1.2- mg Cap Take  by mouth. ZYRTEC PO Take  by mouth. Prescriptions Sent to Pharmacy Refills  
 labetalol (NORMODYNE) 100 mg tablet 5 Sig: Take 1 Tab by mouth two (2) times a day. Class: Normal  
 Pharmacy: [facility] [address] [facility] [address] Ph #: [phone] Route: Oral  
  
We Performed the Following AMB POC URINALYSIS DIP STICK MANUAL W/O MICRO [18551 CPT(R)] Follow-up Instructions Return in about 4 weeks (around 10/19/2018) for Routine OB visit WITH U/S WITH MRS. KAN. Introducing [facility]! Dear Kirk Álvarez: 
Thank you for requesting a Bycler account. Our records indicate that you already have an active Bycler account. You can access your account anytime at [email or web address]. [employer]/[facility] Did you know that you can access your hospital and ER discharge instructions at any time in Bycler? You can also review all of your test results from your hospital stay or ER visit. Additional Information If you have questions, please visit the Frequently Asked Questions section of the Bycler website at [email or web address] Remember, Bycler is NOT to be used for urgent needs. For medical emergencies, dial 911. Now available from your iPhone and Android! Please provide this summary of care documentation to your next provider. Your primary care clinician is listed as Alcides Tomlinson. If you have any questions after today's visit, please call [phone].

## 2018-09-21 NOTE — PROGRESS NOTES
Ms. Ritika Duckworth is a G3   15w0d with Estimated Date of Delivery: 3/15/19 presents to the office for a routine  prenatal visit. She denies contractions, leakage of fluid, or vaginal bleeding. She reports normal fetal movement. Visit Vitals    BP (!) 131/95 (BP 1 Location: Left arm, BP Patient Position: Sitting)    Pulse 76    Resp 18    Ht 5' 6\" (1.676 m)    Wt 214 lb (97.1 kg)    LMP 06/09/2018 (Exact Date)    SpO2 100%    BMI 34.54 kg/m2       A/P  15w0d IUP, normal prenatal visit. 1. Continue routine prenatal care  2. Strict 3rd trimester precautions given. Advised regarding leakage of fluid, contractions, and vaginal bleeding. Fetal kick count instructions given. 3. F/U in 4 weeks with Morphology   4.  Labetalol 100 BID

## 2018-10-19 ENCOUNTER — ROUTINE PRENATAL (OUTPATIENT)
Dept: OBGYN CLINIC | Age: 28
End: 2018-10-19

## 2018-10-19 VITALS
WEIGHT: 217.4 LBS | SYSTOLIC BLOOD PRESSURE: 113 MMHG | RESPIRATION RATE: 20 BRPM | OXYGEN SATURATION: 100 % | HEART RATE: 74 BPM | HEIGHT: 66 IN | BODY MASS INDEX: 34.94 KG/M2 | DIASTOLIC BLOOD PRESSURE: 77 MMHG

## 2018-10-19 DIAGNOSIS — Z3A.19 19 WEEKS GESTATION OF PREGNANCY: Primary | ICD-10-CM

## 2018-10-19 DIAGNOSIS — Z34.82 ENCOUNTER FOR SUPERVISION OF OTHER NORMAL PREGNANCY, SECOND TRIMESTER: ICD-10-CM

## 2018-10-19 LAB
BILIRUB UR QL STRIP: NEGATIVE
GLUCOSE UR-MCNC: NEGATIVE MG/DL
KETONES P FAST UR STRIP-MCNC: NEGATIVE MG/DL
PH UR STRIP: 7.5 [PH] (ref 4.6–8)
PROT UR QL STRIP: NEGATIVE
SP GR UR STRIP: 1.01 (ref 1–1.03)
UA UROBILINOGEN AMB POC: NORMAL (ref 0.2–1)
URINALYSIS CLARITY POC: CLEAR
URINALYSIS COLOR POC: YELLOW
URINE BLOOD POC: NEGATIVE
URINE LEUKOCYTES POC: NORMAL
URINE NITRITES POC: NEGATIVE

## 2018-10-19 RX ORDER — LORATADINE 10 MG/1
10 TABLET ORAL
COMMUNITY
End: 2019-03-14

## 2018-10-19 NOTE — PROGRESS NOTES
Ms. Isabell Asencio is a G3   19w0d with Estimated Date of Delivery: 3/15/19 presents to the office for a routine  prenatal visit. She denies contractions, leakage of fluid, or vaginal bleeding. She reports normal fetal movement. Visit Vitals  /77   Pulse 74   Resp 20   Ht 5' 6\" (1.676 m)   Wt 217 lb 6.4 oz (98.6 kg)   LMP 06/09/2018 (Exact Date)   SpO2 100%   BMI 35.09 kg/m²       A/P  19w0d IUP, normal prenatal visit. 1. Continue routine prenatal care  2. Strict 3rd trimester precautions given. Advised regarding leakage of fluid, contractions, and vaginal bleeding. Fetal kick count instructions given. 3. F/U in 4 weeks with flu shot  4.  Morphology scan Monday

## 2018-11-16 ENCOUNTER — ROUTINE PRENATAL (OUTPATIENT)
Dept: OBGYN CLINIC | Age: 28
End: 2018-11-16

## 2018-11-16 VITALS
BODY MASS INDEX: 35.68 KG/M2 | HEART RATE: 94 BPM | WEIGHT: 222 LBS | DIASTOLIC BLOOD PRESSURE: 81 MMHG | SYSTOLIC BLOOD PRESSURE: 131 MMHG | HEIGHT: 66 IN

## 2018-11-16 DIAGNOSIS — Z3A.23 PREGNANCY WITH 23 COMPLETED WEEKS GESTATION: Primary | ICD-10-CM

## 2018-11-16 NOTE — PROGRESS NOTES
23w0d. Pt. Notes nightly cramping- mild. Noted normal B/P- not taking Labetalol. Declines genetics. Glucola and CBC next week. RTO 4 weeks.

## 2018-12-12 ENCOUNTER — HOSPITAL ENCOUNTER (OUTPATIENT)
Dept: LAB | Age: 28
Discharge: HOME | End: 2018-12-12

## 2018-12-12 ENCOUNTER — ROUTINE PRENATAL (OUTPATIENT)
Dept: OBGYN CLINIC | Age: 28
End: 2018-12-12

## 2018-12-12 VITALS
BODY MASS INDEX: 36 KG/M2 | WEIGHT: 224 LBS | HEIGHT: 66 IN | SYSTOLIC BLOOD PRESSURE: 137 MMHG | HEART RATE: 100 BPM | DIASTOLIC BLOOD PRESSURE: 82 MMHG

## 2018-12-12 DIAGNOSIS — Z3A.26 26 WEEKS GESTATION OF PREGNANCY: ICD-10-CM

## 2018-12-12 DIAGNOSIS — R03.0 ELEVATED BLOOD PRESSURE READING WITHOUT DIAGNOSIS OF HYPERTENSION: Primary | ICD-10-CM

## 2018-12-12 PROCEDURE — 99001 SPECIMEN HANDLING PT-LAB: CPT

## 2018-12-12 NOTE — PROGRESS NOTES
26w5d. No OB issues. Denies LOF/VB  Noted borderline B/P. Pt has Labetalol, but hasn't taken it. Monitoring at home- encouraged meds if B/P >140/90. [address] labs today. 24 hour urine ASAP  Glucola today. RTO 3 weeks.

## 2018-12-13 LAB
ALBUMIN SERPL-MCNC: 3.9 G/DL (ref 3.5–5.5)
ALBUMIN/GLOB SERPL: 1.3 {RATIO} (ref 1.2–2.2)
ALP SERPL-CCNC: 61 IU/L (ref 39–117)
ALT SERPL-CCNC: 11 IU/L (ref 0–32)
AST SERPL-CCNC: 16 IU/L (ref 0–40)
BASOPHILS # BLD AUTO: 0 X10E3/UL (ref 0–0.2)
BASOPHILS NFR BLD AUTO: 0 %
BILIRUB SERPL-MCNC: 1 MG/DL (ref 0–1.2)
BUN SERPL-MCNC: 6 MG/DL (ref 6–20)
BUN/CREAT SERPL: 10 (ref 9–23)
CALCIUM SERPL-MCNC: 9 MG/DL (ref 8.7–10.2)
CHLORIDE SERPL-SCNC: 102 MMOL/L (ref 96–106)
CO2 SERPL-SCNC: 19 MMOL/L (ref 20–29)
CREAT SERPL-MCNC: 0.6 MG/DL (ref 0.57–1)
EOSINOPHIL # BLD AUTO: 0.3 X10E3/UL (ref 0–0.4)
EOSINOPHIL NFR BLD AUTO: 2 %
ERYTHROCYTE [DISTWIDTH] IN BLOOD BY AUTOMATED COUNT: 13.7 % (ref 12.3–15.4)
GLOBULIN SER CALC-MCNC: 2.9 G/DL (ref 1.5–4.5)
GLUCOSE 1H P 50 G GLC PO SERPL-MCNC: 106 MG/DL (ref 65–139)
GLUCOSE SERPL-MCNC: 110 MG/DL (ref 65–99)
HCT VFR BLD AUTO: 33 % (ref 34–46.6)
HGB BLD-MCNC: 11.2 G/DL (ref 11.1–15.9)
IMM GRANULOCYTES # BLD: 0 X10E3/UL (ref 0–0.1)
IMM GRANULOCYTES NFR BLD: 0 %
LDH SERPL-CCNC: 178 IU/L (ref 119–226)
LYMPHOCYTES # BLD AUTO: 1.9 X10E3/UL (ref 0.7–3.1)
LYMPHOCYTES NFR BLD AUTO: 18 %
MCH RBC QN AUTO: 31.2 PG (ref 26.6–33)
MCHC RBC AUTO-ENTMCNC: 33.9 G/DL (ref 31.5–35.7)
MCV RBC AUTO: 92 FL (ref 79–97)
MONOCYTES # BLD AUTO: 0.6 X10E3/UL (ref 0.1–0.9)
MONOCYTES NFR BLD AUTO: 6 %
NEUTROPHILS # BLD AUTO: 7.9 X10E3/UL (ref 1.4–7)
NEUTROPHILS NFR BLD AUTO: 74 %
PLATELET # BLD AUTO: 214 X10E3/UL (ref 150–379)
POTASSIUM SERPL-SCNC: 4.3 MMOL/L (ref 3.5–5.2)
PROT SERPL-MCNC: 6.8 G/DL (ref 6–8.5)
RBC # BLD AUTO: 3.59 X10E6/UL (ref 3.77–5.28)
SODIUM SERPL-SCNC: 138 MMOL/L (ref 134–144)
URATE SERPL-MCNC: 3.3 MG/DL (ref 2.5–7.1)
WBC # BLD AUTO: 10.6 X10E3/UL (ref 3.4–10.8)

## 2018-12-14 ENCOUNTER — TELEPHONE (OUTPATIENT)
Dept: OBGYN CLINIC | Age: 28
End: 2018-12-14

## 2018-12-14 NOTE — TELEPHONE ENCOUNTER
----- Message from Gustavo Boeck, MD sent at 12/13/2018  5:02 PM EST -----  Normal glucola.   Please inform

## 2018-12-15 ENCOUNTER — HOSPITAL ENCOUNTER (OUTPATIENT)
Dept: LAB | Age: 28
Discharge: HOME | End: 2018-12-15

## 2018-12-15 PROCEDURE — 99001 SPECIMEN HANDLING PT-LAB: CPT

## 2018-12-18 LAB
PROT 24H UR-MRATE: 236 MG/24 HR (ref 30–150)
PROT UR-MCNC: 12.4 MG/DL

## 2019-01-09 ENCOUNTER — ROUTINE PRENATAL (OUTPATIENT)
Dept: OBGYN CLINIC | Age: 29
End: 2019-01-09

## 2019-01-09 VITALS
WEIGHT: 230 LBS | DIASTOLIC BLOOD PRESSURE: 86 MMHG | HEIGHT: 66 IN | BODY MASS INDEX: 36.96 KG/M2 | SYSTOLIC BLOOD PRESSURE: 136 MMHG | HEART RATE: 73 BPM

## 2019-01-09 DIAGNOSIS — Z3A.30 PREGNANCY WITH 30 COMPLETED WEEKS GESTATION: Primary | ICD-10-CM

## 2019-01-09 NOTE — PATIENT INSTRUCTIONS

## 2019-01-09 NOTE — PROGRESS NOTES
30w5d. No OB issues. Denies PIH symptoms. Noted borderline blood pressure. U/A: Protein: neg  Pt has been diagnosed with hypertension prior to pregnancy, which improved with stress and anxiety management. She also has had gestational hypertension in a prior pregnancy with successful . Prior CD was for failure to progress. Tdap and flu today. RTO 2 weeks.

## 2019-01-22 ENCOUNTER — ROUTINE PRENATAL (OUTPATIENT)
Dept: OBGYN CLINIC | Age: 29
End: 2019-01-22

## 2019-01-22 VITALS
DIASTOLIC BLOOD PRESSURE: 76 MMHG | HEART RATE: 74 BPM | RESPIRATION RATE: 18 BRPM | BODY MASS INDEX: 37.12 KG/M2 | SYSTOLIC BLOOD PRESSURE: 128 MMHG | HEIGHT: 66 IN | WEIGHT: 231 LBS

## 2019-01-22 DIAGNOSIS — Z3A.32 32 WEEKS GESTATION OF PREGNANCY: ICD-10-CM

## 2019-01-22 DIAGNOSIS — Z34.83 PRENATAL CARE, SUBSEQUENT PREGNANCY, THIRD TRIMESTER: Primary | ICD-10-CM

## 2019-01-22 NOTE — PROGRESS NOTES
Patient without complaints, good fetal movement. Third trimester folder given. Follow up 2 weeks. Plan of care discussed. Patient expressed understanding.

## 2019-02-06 ENCOUNTER — ROUTINE PRENATAL (OUTPATIENT)
Dept: OBGYN CLINIC | Age: 29
End: 2019-02-06

## 2019-02-06 VITALS
DIASTOLIC BLOOD PRESSURE: 79 MMHG | WEIGHT: 234 LBS | RESPIRATION RATE: 20 BRPM | BODY MASS INDEX: 37.61 KG/M2 | HEART RATE: 70 BPM | HEIGHT: 66 IN | SYSTOLIC BLOOD PRESSURE: 130 MMHG

## 2019-02-06 DIAGNOSIS — Z3A.34 PREGNANCY WITH 34 COMPLETED WEEKS GESTATION: Primary | ICD-10-CM

## 2019-02-06 PROBLEM — O10.913 CHRONIC HYPERTENSION COMPLICATING OR REASON FOR CARE DURING PREGNANCY, THIRD TRIMESTER: Status: ACTIVE | Noted: 2019-02-06

## 2019-02-06 NOTE — PATIENT INSTRUCTIONS
Weeks 34 to 36 of Your Pregnancy: Care Instructions  Your Care Instructions    By now, your baby and your belly have grown quite large. It is almost time to give birth. A full-term pregnancy can deliver between 37 and 42 weeks. Your baby's lungs are almost ready to breathe air. The bones in your baby's head are now firm enough to protect it, but soft enough to move down through the birth canal.  You may feel excited, happy, anxious, or scared. You may wonder how you will know if you are in labor or what to expect during labor. Try to be flexible in your expectations of the birth. Because each birth is different, there is no way to know exactly what childbirth will be like for you. This care sheet will help you know what to expect and how to prepare. This may make your childbirth easier. If you haven't already had the Tdap shot during this pregnancy, talk to your doctor about getting it. It will help protect your  against pertussis infection. In the 36th week, most women have a test for group B streptococcus (GBS). GBS is a common bacteria that can live in the vagina and rectum. It can make your baby sick after birth. If you test positive, you will get antibiotics during labor. The medicine will keep your baby from getting the bacteria. Follow-up care is a key part of your treatment and safety. Be sure to make and go to all appointments, and call your doctor if you are having problems. It's also a good idea to know your test results and keep a list of the medicines you take. How can you care for yourself at home? Learn about pain relief choices  · Pain is different for every woman. Talk with your doctor about your feelings about pain. · You can choose from several types of pain relief. These include medicine or breathing techniques, as well as comfort measures. You can use more than one option. · If you choose to have pain medicine during labor, talk to your doctor about your options.  Some medicines lower anxiety and help with some of the pain. Others make your lower body numb so that you won't feel pain. · Be sure to tell your doctor about your pain medicine choice before you start labor or very early in your labor. You may be able to change your mind as labor progresses. · Rarely, a woman is put to sleep by medicine given through a mask or an IV. Labor and delivery  · The first stage of labor has three parts: early, active, and transition. ? Most women have early labor at home. You can stay busy or rest, eat light snacks, drink clear fluids, and start counting contractions. ? When talking during a contraction gets hard, you may be moving to active labor. During active labor, you should head for the hospital if you are not there already. ? You are in active labor when contractions come every 3 to 4 minutes and last about 60 seconds. Your cervix is opening more rapidly. ? If your water breaks, contractions will come faster and stronger. ? During transition, your cervix is stretching, and contractions are coming more rapidly. ? You may want to push, but your cervix might not be ready. Your doctor will tell you when to push. · The second stage starts when your cervix is completely opened and you are ready to push. ? Contractions are very strong to push the baby down the birth canal.  ? You will feel the urge to push. You may feel like you need to have a bowel movement. ? You may be coached to push with contractions. These contractions will be very strong, but you will not have them as often. You can get a little rest between contractions. ? You may be emotional and irritable. You may not be aware of what is going on around you.  ? One last push, and your baby is born. · The third stage is when a few more contractions push out the placenta. This may take 30 minutes or less. · The fourth stage is the welcome recovery. You may feel overwhelmed with emotions and exhausted but alert.  This is a good time to start breastfeeding. Where can you learn more? Go to http://zac-fatuma.info/. Enter U371 in the search box to learn more about \"Weeks 34 to 36 of Your Pregnancy: Care Instructions. \"  Current as of: September 5, 2018  Content Version: 11.9  © 3184-0337 [employer]. Care instructions adapted under license by [employer] (which disclaims liability or warranty for this information). If you have questions about a medical condition or this instruction, always ask your healthcare professional. [address] any warranty or liability for your use of this information.

## 2019-02-06 NOTE — PROGRESS NOTES
34w5d. No OB issues. Denies LOF/Vb  Desires - has had a successful . TOLAC Consent signed today- has documented LTCS  RTO 2 weeks. GBS and cultures at next visit.

## 2019-02-12 ENCOUNTER — TELEPHONE (OUTPATIENT)
Dept: OBGYN CLINIC | Age: 29
End: 2019-02-12

## 2019-02-12 NOTE — TELEPHONE ENCOUNTER
Incoming  Call from this patient concerning  Elevated blood pressures , she states she already has a  Rx for Labetalol   Pt will e-mail in her Value log . ,

## 2019-02-20 ENCOUNTER — ROUTINE PRENATAL (OUTPATIENT)
Dept: OBGYN CLINIC | Age: 29
End: 2019-02-20

## 2019-02-20 VITALS
TEMPERATURE: 97.2 F | DIASTOLIC BLOOD PRESSURE: 89 MMHG | WEIGHT: 236 LBS | HEIGHT: 66 IN | SYSTOLIC BLOOD PRESSURE: 138 MMHG | RESPIRATION RATE: 19 BRPM | HEART RATE: 86 BPM | BODY MASS INDEX: 37.93 KG/M2

## 2019-02-20 DIAGNOSIS — Z3A.36 36 WEEKS GESTATION OF PREGNANCY: Primary | ICD-10-CM

## 2019-02-20 LAB
BILIRUB UR QL STRIP: NEGATIVE
GLUCOSE UR-MCNC: NEGATIVE MG/DL
KETONES P FAST UR STRIP-MCNC: NEGATIVE MG/DL
PH UR STRIP: 6 [PH] (ref 4.6–8)
PROT UR QL STRIP: NEGATIVE
SP GR UR STRIP: 1.03 (ref 1–1.03)
UA UROBILINOGEN AMB POC: NORMAL (ref 0.2–1)
URINALYSIS CLARITY POC: CLEAR
URINALYSIS COLOR POC: YELLOW
URINE BLOOD POC: NEGATIVE
URINE LEUKOCYTES POC: NEGATIVE
URINE NITRITES POC: NEGATIVE

## 2019-02-20 NOTE — PROGRESS NOTES
36w5d.  No Ob issues. GBS and cultures today. Pt declined SVE. CHTN: pt has been keeping a log. Most readings within normal range. U/A: S.030; neg protein  PIH precautions. RTO 1 week.

## 2019-02-20 NOTE — PATIENT INSTRUCTIONS
Weeks 34 to 36 of Your Pregnancy: Care Instructions  Your Care Instructions    By now, your baby and your belly have grown quite large. It is almost time to give birth. A full-term pregnancy can deliver between 37 and 42 weeks. Your baby's lungs are almost ready to breathe air. The bones in your baby's head are now firm enough to protect it, but soft enough to move down through the birth canal.  You may feel excited, happy, anxious, or scared. You may wonder how you will know if you are in labor or what to expect during labor. Try to be flexible in your expectations of the birth. Because each birth is different, there is no way to know exactly what childbirth will be like for you. This care sheet will help you know what to expect and how to prepare. This may make your childbirth easier. If you haven't already had the Tdap shot during this pregnancy, talk to your doctor about getting it. It will help protect your  against pertussis infection. In the 36th week, most women have a test for group B streptococcus (GBS). GBS is a common bacteria that can live in the vagina and rectum. It can make your baby sick after birth. If you test positive, you will get antibiotics during labor. The medicine will keep your baby from getting the bacteria. Follow-up care is a key part of your treatment and safety. Be sure to make and go to all appointments, and call your doctor if you are having problems. It's also a good idea to know your test results and keep a list of the medicines you take. How can you care for yourself at home? Learn about pain relief choices  · Pain is different for every woman. Talk with your doctor about your feelings about pain. · You can choose from several types of pain relief. These include medicine or breathing techniques, as well as comfort measures. You can use more than one option. · If you choose to have pain medicine during labor, talk to your doctor about your options.  Some medicines lower anxiety and help with some of the pain. Others make your lower body numb so that you won't feel pain. · Be sure to tell your doctor about your pain medicine choice before you start labor or very early in your labor. You may be able to change your mind as labor progresses. · Rarely, a woman is put to sleep by medicine given through a mask or an IV. Labor and delivery  · The first stage of labor has three parts: early, active, and transition. ? Most women have early labor at home. You can stay busy or rest, eat light snacks, drink clear fluids, and start counting contractions. ? When talking during a contraction gets hard, you may be moving to active labor. During active labor, you should head for the hospital if you are not there already. ? You are in active labor when contractions come every 3 to 4 minutes and last about 60 seconds. Your cervix is opening more rapidly. ? If your water breaks, contractions will come faster and stronger. ? During transition, your cervix is stretching, and contractions are coming more rapidly. ? You may want to push, but your cervix might not be ready. Your doctor will tell you when to push. · The second stage starts when your cervix is completely opened and you are ready to push. ? Contractions are very strong to push the baby down the birth canal.  ? You will feel the urge to push. You may feel like you need to have a bowel movement. ? You may be coached to push with contractions. These contractions will be very strong, but you will not have them as often. You can get a little rest between contractions. ? You may be emotional and irritable. You may not be aware of what is going on around you.  ? One last push, and your baby is born. · The third stage is when a few more contractions push out the placenta. This may take 30 minutes or less. · The fourth stage is the welcome recovery. You may feel overwhelmed with emotions and exhausted but alert.  This is a good time to start breastfeeding. Where can you learn more? Go to http://zac-fatuma.info/. Enter W260 in the search box to learn more about \"Weeks 34 to 36 of Your Pregnancy: Care Instructions. \"  Current as of: September 5, 2018  Content Version: 11.9  © 9339-1622 [employer]. Care instructions adapted under license by [employer] (which disclaims liability or warranty for this information). If you have questions about a medical condition or this instruction, always ask your healthcare professional. [address] any warranty or liability for your use of this information.

## 2019-02-22 LAB
C TRACH RRNA SPEC QL NAA+PROBE: NEGATIVE
N GONORRHOEA RRNA SPEC QL NAA+PROBE: NEGATIVE
T VAGINALIS RRNA VAG QL NAA+PROBE: NEGATIVE

## 2019-02-25 LAB — B-HEM STREP SPEC QL CULT: POSITIVE

## 2019-02-27 ENCOUNTER — ROUTINE PRENATAL (OUTPATIENT)
Dept: OBGYN CLINIC | Age: 29
End: 2019-02-27

## 2019-02-27 VITALS
WEIGHT: 242 LBS | TEMPERATURE: 98.1 F | HEART RATE: 67 BPM | SYSTOLIC BLOOD PRESSURE: 134 MMHG | HEIGHT: 66 IN | BODY MASS INDEX: 38.89 KG/M2 | DIASTOLIC BLOOD PRESSURE: 84 MMHG | RESPIRATION RATE: 18 BRPM

## 2019-02-27 DIAGNOSIS — Z3A.38 PREGNANCY WITH 38 COMPLETED WEEKS GESTATION: Primary | ICD-10-CM

## 2019-02-27 NOTE — PATIENT INSTRUCTIONS

## 2019-02-27 NOTE — PROGRESS NOTES
37w5d. No Ob issues. GBS+; discussed implications in labor. B/P controlled on Labetalol. Denies PIH symptoms  Labor precautions. RTO 1 week.

## 2019-03-06 ENCOUNTER — HOSPITAL ENCOUNTER (OUTPATIENT)
Age: 29
Discharge: HOME | End: 2019-03-06
Attending: OBSTETRICS & GYNECOLOGY | Admitting: OBSTETRICS & GYNECOLOGY
Payer: COMMERCIAL

## 2019-03-06 ENCOUNTER — ROUTINE PRENATAL (OUTPATIENT)
Dept: OBGYN CLINIC | Age: 29
End: 2019-03-06

## 2019-03-06 VITALS
TEMPERATURE: 96.7 F | DIASTOLIC BLOOD PRESSURE: 83 MMHG | RESPIRATION RATE: 18 BRPM | BODY MASS INDEX: 39.05 KG/M2 | HEIGHT: 66 IN | SYSTOLIC BLOOD PRESSURE: 140 MMHG | WEIGHT: 243 LBS | HEART RATE: 70 BPM

## 2019-03-06 VITALS
DIASTOLIC BLOOD PRESSURE: 80 MMHG | SYSTOLIC BLOOD PRESSURE: 131 MMHG | HEART RATE: 62 BPM | TEMPERATURE: 98 F | RESPIRATION RATE: 18 BRPM

## 2019-03-06 DIAGNOSIS — O10.913 CHRONIC HYPERTENSION COMPLICATING OR REASON FOR CARE DURING PREGNANCY, THIRD TRIMESTER: Primary | ICD-10-CM

## 2019-03-06 PROBLEM — Z34.90 PREGNANCY: Status: ACTIVE | Noted: 2019-03-06

## 2019-03-06 PROCEDURE — 59025 FETAL NON-STRESS TEST: CPT

## 2019-03-06 NOTE — PROGRESS NOTES
38w5d.  No OB issues. CHTN- pt has noted increased blood pressures over the last week in the 140's/90's  No consistent PIH symptoms. Has had a random headache. U/A: protein negative. Interval growth scan ordered. NST today and twice weekly until delivery. Has had a successful  using pitocin and gonzalez bulb.

## 2019-03-06 NOTE — PROGRESS NOTES
wnwf to l&d from office for nst related to gest hypertension- pt oriented to room etc- no support  present--pt coping well- sl teary eyed for brief moment- states more emotional this preg- no currently working to decrease bp without meds- currently on labetalol 100mg BID-     1105-spoke with dr. Diane Nagy concerning reactive NST-baseline 125 with accels to 150-aware of BP and pt not had anything since 3/5/19 dinner- dr. Diane Nagy states will review strip-    1115- discharge instructions given - biweekly NST scheduled- discharged home

## 2019-03-08 LAB — GRBS, EXTERNAL: NORMAL

## 2019-03-10 ENCOUNTER — HOSPITAL ENCOUNTER (OUTPATIENT)
Age: 29
Discharge: HOME | End: 2019-03-10
Attending: OBSTETRICS & GYNECOLOGY | Admitting: OBSTETRICS & GYNECOLOGY
Payer: COMMERCIAL

## 2019-03-10 VITALS
HEART RATE: 91 BPM | WEIGHT: 243 LBS | BODY MASS INDEX: 39.05 KG/M2 | HEIGHT: 66 IN | DIASTOLIC BLOOD PRESSURE: 90 MMHG | SYSTOLIC BLOOD PRESSURE: 139 MMHG

## 2019-03-10 PROBLEM — O28.8 NST (NON-STRESS TEST) NONREACTIVE: Status: ACTIVE | Noted: 2019-03-10

## 2019-03-10 LAB
APPEARANCE UR: CLEAR
BILIRUB UR QL: NEGATIVE
COLOR UR: YELLOW
GLUCOSE UR QL STRIP.AUTO: NEGATIVE MG/DL
KETONES UR-MCNC: NEGATIVE MG/DL
LEUKOCYTE ESTERASE UR QL STRIP: ABNORMAL
NITRITE UR QL: NEGATIVE
PH UR: 6.5 [PH] (ref 5–9)
PROT UR QL: ABNORMAL MG/DL
RBC # UR STRIP: NEGATIVE /UL
SERVICE CMNT-IMP: ABNORMAL
SP GR UR: 1.02 (ref 1–1.02)
UROBILINOGEN UR QL: 0.2 EU/DL (ref 0.2–1)

## 2019-03-10 PROCEDURE — 59025 FETAL NON-STRESS TEST: CPT

## 2019-03-10 PROCEDURE — 81003 URINALYSIS AUTO W/O SCOPE: CPT

## 2019-03-10 NOTE — PROGRESS NOTES
LABOR AND DELIVERY TRIAGE- Non stress test    Patient presents to L&D Triage for NST  Indication: chronic hypertension  Presenting symptoms and initial assessment discussed with RN caring for the patient. EFM reviewed:  Baseline 130's with accels to 150's   [place]:  Quiet. SVE: cl/30/-3    A/P:  NST reactive  Reassuring fetal status. Disposition: Home  IOL scheduled for 3/11 @ 1800 with cervidil.       Pilar Null MD  3/10/2019  2:51 PM

## 2019-03-10 NOTE — PROGRESS NOTES
[address] with Dr Miky Owens concerning reactive NST baseline 130 with accels to 145-aware of BP. Dr Miky Owens to review strip, speak to patient about scheduled induction with cervical ripening. [id number] [facility] Dr Miky Owens bedside SVE 0/30/-3. Patient d/c home with scheduled induction. Discharge instructions reviewed with pt verbally and pt given written copy of instructions. NOTIFY YOUR DOCTOR/PROVIDER IF:    Signs and symptoms of labor-continuing tightening and relaxation of abdomen  Fever 100.4  Bleeding or leaking of fluid from the vagina  Persistent headache that does not go away with rest and tylenol  Severe abdominal pain    Fetal kick counts - 10 baby movements in 1 hour   Hydration- eight 8 oz glasses of water every day  Visual disturbances  Nausea and vomiting for more than 12 hours. Questions asked and answered.         Follow up in one day with OBGYN

## 2019-03-10 NOTE — PROGRESS NOTES
Patient ambulatory to unit for scheduled NST . . 39.2  weeks. Denies leaking of vaginal fluids or bleeding. States positive fetal movement. EFM and [place] applied. FHR is 130. Oriented to room and surroundings. Significant other supportive at bedside.

## 2019-03-10 NOTE — DISCHARGE INSTRUCTIONS
Discharge instructions reviewed with pt verbally and pt given written copy of instructions. NOTIFY YOUR DOCTOR/PROVIDER IF:    Signs and symptoms of labor-continuing tightening and relaxation of abdomen  Fever 100.4  Bleeding or leaking of fluid from the vagina  Persistent headache that does not go away with rest and tylenol  Severe abdominal pain    Fetal kick counts - 10 baby movements in 1 hour   Hydration- eight 8 oz glasses of water every day  Visual disturbances  Nausea and vomiting for more than 12 hours. Questions asked and answered.         Follow up tomorrow with OBGYN

## 2019-03-11 ENCOUNTER — HOSPITAL ENCOUNTER (INPATIENT)
Age: 29
LOS: 3 days | Discharge: HOME | End: 2019-03-14
Attending: SPECIALIST | Admitting: SPECIALIST
Payer: COMMERCIAL

## 2019-03-11 DIAGNOSIS — Z3A.39 39 WEEKS GESTATION OF PREGNANCY: ICD-10-CM

## 2019-03-11 DIAGNOSIS — Z98.891 H/O: C-SECTION: ICD-10-CM

## 2019-03-11 DIAGNOSIS — Z98.891 HISTORY OF VBAC: ICD-10-CM

## 2019-03-11 PROBLEM — O10.913 CHRONIC HYPERTENSION WITH EXACERBATION DURING PREGNANCY IN THIRD TRIMESTER: Status: ACTIVE | Noted: 2019-03-11

## 2019-03-11 LAB
ABO + RH BLD: NORMAL
BASOPHILS # BLD: 0 K/UL (ref 0–0.1)
BASOPHILS NFR BLD: 0 % (ref 0–2)
BLOOD GROUP ANTIBODIES SERPL: NORMAL
DIFFERENTIAL METHOD BLD: ABNORMAL
EOSINOPHIL # BLD: 0.1 K/UL (ref 0–0.4)
EOSINOPHIL NFR BLD: 1 % (ref 0–5)
ERYTHROCYTE [DISTWIDTH] IN BLOOD BY AUTOMATED COUNT: 13.3 % (ref 11.6–14.5)
HCT VFR BLD AUTO: 30.5 % (ref 35–45)
HGB BLD-MCNC: 10.2 G/DL (ref 12–16)
LYMPHOCYTES # BLD: 2.3 K/UL (ref 0.9–3.6)
LYMPHOCYTES NFR BLD: 24 % (ref 21–52)
MCH RBC QN AUTO: 30.4 PG (ref 24–34)
MCHC RBC AUTO-ENTMCNC: 33.4 G/DL (ref 31–37)
MCV RBC AUTO: 91 FL (ref 74–97)
MONOCYTES # BLD: 0.8 K/UL (ref 0.05–1.2)
MONOCYTES NFR BLD: 8 % (ref 3–10)
NEUTS SEG # BLD: 6.3 K/UL (ref 1.8–8)
NEUTS SEG NFR BLD: 67 % (ref 40–73)
PLATELET # BLD AUTO: 190 K/UL (ref 135–420)
PMV BLD AUTO: 10.3 FL (ref 9.2–11.8)
RBC # BLD AUTO: 3.35 M/UL (ref 4.2–5.3)
SPECIMEN EXP DATE BLD: NORMAL
WBC # BLD AUTO: 9.4 K/UL (ref 4.6–13.2)

## 2019-03-11 PROCEDURE — 4A0HXCZ MEASUREMENT OF PRODUCTS OF CONCEPTION, CARDIAC RATE, EXTERNAL APPROACH: ICD-10-PCS | Performed by: OBSTETRICS & GYNECOLOGY

## 2019-03-11 PROCEDURE — 74011250636 HC RX REV CODE- 250/636: Performed by: SPECIALIST

## 2019-03-11 PROCEDURE — 65270000029 HC RM PRIVATE

## 2019-03-11 PROCEDURE — 85025 COMPLETE CBC W/AUTO DIFF WBC: CPT

## 2019-03-11 PROCEDURE — 75410000002 HC LABOR FEE PER 1 HR

## 2019-03-11 PROCEDURE — 3E033VJ INTRODUCTION OF OTHER HORMONE INTO PERIPHERAL VEIN, PERCUTANEOUS APPROACH: ICD-10-PCS | Performed by: OBSTETRICS & GYNECOLOGY

## 2019-03-11 PROCEDURE — 86900 BLOOD TYPING SEROLOGIC ABO: CPT

## 2019-03-11 RX ORDER — OXYTOCIN/RINGER'S LACTATE 20/1000 ML
125 PLASTIC BAG, INJECTION (ML) INTRAVENOUS CONTINUOUS
Status: DISCONTINUED | OUTPATIENT
Start: 2019-03-11 | End: 2019-03-14 | Stop reason: HOSPADM

## 2019-03-11 RX ORDER — OXYTOCIN/0.9 % SODIUM CHLORIDE 30/500 ML
0-25 PLASTIC BAG, INJECTION (ML) INTRAVENOUS
Status: DISCONTINUED | OUTPATIENT
Start: 2019-03-11 | End: 2019-03-13

## 2019-03-11 RX ORDER — OXYTOCIN/RINGER'S LACTATE 20/1000 ML
999 PLASTIC BAG, INJECTION (ML) INTRAVENOUS ONCE
Status: ACTIVE | OUTPATIENT
Start: 2019-03-11 | End: 2019-03-12

## 2019-03-11 RX ORDER — SODIUM CHLORIDE, SODIUM LACTATE, POTASSIUM CHLORIDE, CALCIUM CHLORIDE 600; 310; 30; 20 MG/100ML; MG/100ML; MG/100ML; MG/100ML
125 INJECTION, SOLUTION INTRAVENOUS CONTINUOUS
Status: DISCONTINUED | OUTPATIENT
Start: 2019-03-11 | End: 2019-03-13

## 2019-03-11 RX ORDER — CARBOPROST TROMETHAMINE 250 UG/ML
250 INJECTION, SOLUTION INTRAMUSCULAR
Status: ACTIVE | OUTPATIENT
Start: 2019-03-11 | End: 2019-03-12

## 2019-03-11 RX ORDER — ONDANSETRON 2 MG/ML
4 INJECTION INTRAMUSCULAR; INTRAVENOUS
Status: DISCONTINUED | OUTPATIENT
Start: 2019-03-11 | End: 2019-03-13

## 2019-03-11 RX ORDER — LIDOCAINE HYDROCHLORIDE 10 MG/ML
20 INJECTION, SOLUTION EPIDURAL; INFILTRATION; INTRACAUDAL; PERINEURAL AS NEEDED
Status: DISCONTINUED | OUTPATIENT
Start: 2019-03-11 | End: 2019-03-13

## 2019-03-11 RX ORDER — MINERAL OIL
30 OIL (ML) ORAL AS NEEDED
Status: DISCONTINUED | OUTPATIENT
Start: 2019-03-11 | End: 2019-03-13

## 2019-03-11 RX ORDER — TERBUTALINE SULFATE 1 MG/ML
0.25 INJECTION SUBCUTANEOUS
Status: DISCONTINUED | OUTPATIENT
Start: 2019-03-11 | End: 2019-03-13

## 2019-03-11 RX ORDER — MISOPROSTOL 200 UG/1
800 TABLET ORAL
Status: ACTIVE | OUTPATIENT
Start: 2019-03-11 | End: 2019-03-12

## 2019-03-11 RX ORDER — NALBUPHINE HYDROCHLORIDE 10 MG/ML
10 INJECTION, SOLUTION INTRAMUSCULAR; INTRAVENOUS; SUBCUTANEOUS
Status: DISCONTINUED | OUTPATIENT
Start: 2019-03-11 | End: 2019-03-13

## 2019-03-11 RX ADMIN — OXYTOCIN-SODIUM CHLORIDE 0.9% IV SOLN 30 UNIT/500ML 2 MILLI-UNITS/MIN: 30-0.9/5 SOLUTION at 20:23

## 2019-03-11 RX ADMIN — SODIUM CHLORIDE, SODIUM LACTATE, POTASSIUM CHLORIDE, AND CALCIUM CHLORIDE 125 ML/HR: 600; 310; 30; 20 INJECTION, SOLUTION INTRAVENOUS at 20:20

## 2019-03-11 NOTE — H&P
History & Physical    Name: Buddy Qureshi MRN: [id number]  SSN: [id number]    YOB: 1990  Age: 26 y.o. Sex: female        Subjective:     Estimated Date of Delivery: 3/15/19  OB History    Para Term  AB Living   3 2 2     2   SAB TAB Ectopic Molar Multiple Live Births             2      # Outcome Date GA Lbr Kervin/2nd Weight Sex Delivery Anes PTL Lv   3 Current            2 Term 14 40w4d 10:48 / 00:30 8 lb 13.1 oz (4 kg) M VAGINAL DELI EPIDURAL AN N JUAN M   1 Term 2011 41w0d  8 lb 5 oz (3.771 kg) M    JUAN M          Ms. Sage Doctor is a 26 y.o.  admitted with pregnancy at 39w3d for induction of labor. Prenatal course was complicated by previous  delivery x 1 with successful  x 1, obesity, and chronic hypertension. Please see prenatal records for further details. Of note, her gestational age was determined by her dating ultrasound performed on   where she was 8w6d.     Past Medical History:   Diagnosis Date    Chronic hypertension complicating or reason for care during pregnancy, third trimester 2019    Essential hypertension     with this pregnancy    H/O:  10/21/2013    History of  2015    Normal pregnancy, repeat 2013     Past Surgical History:   Procedure Laterality Date     DELIVERY ONLY      HX  SECTION      HX TONSIL AND ADENOIDECTOMY Bilateral      Social History     Socioeconomic History    Marital status:      Spouse name: Not on file    Number of children: Not on file    Years of education: Not on file    Highest education level: Not on file   Tobacco Use    Smoking status: Never Smoker    Smokeless tobacco: Never Used   Substance and Sexual Activity    Alcohol use: No    Drug use: No    Sexual activity: Yes     Partners: Male      Family History   Problem Relation Age of Onset    Cancer Paternal Grandmother         lung    Cancer Paternal Aunt        No Known Allergies  Prior to Admission medications    Medication Sig Start Date End Date Taking? Authorizing Provider   labetalol (NORMODYNE) 100 mg tablet Take 1 Tab by mouth two (2) times a day. 9/21/18  Yes Ashley Ramírez MD   ZKS45-Ihkw Fumarate-FA-DSS-DHA 26-1.2- mg cap Take  by mouth. Yes Provider, Historical   loratadine (CLARITIN) 10 mg tablet Take 10 mg by mouth. Provider, Historical   CETIRIZINE HCL (ZYRTEC PO) Take  by mouth. Provider, Historical        Objective:     Vitals:  Vitals:    03/11/19 1820   BP: (!) 131/92   Pulse: 61   Resp: 16   Temp: 98.1 °F (36.7 °C)        Physical Exam:  Patient without distress. Heart: Regular rate and rhythm, S1S2 present or without murmur or extra heart sounds  Lung: clear to auscultation throughout lung fields, no wheezes, no rales, no rhonchi and normal respiratory effort  Cervical Exam: fingertip/0/-3  Lower Extremities: no edema; kera's absent  Membranes:  Intact  Fetal Heart Rate: Reactive  [place]: no contractions noted    Prenatal Labs:   Lab Results   Component Value Date/Time    ABO/Rh(D) PENDING 03/11/2019 06:39 PM    Rubella, External IMMUNE 09/17/2013    GrBStrep, External NEGATIVE 04/01/2014    HBsAg, External NEGATIVE 09/17/2013    HIV, External NEGATIVE 09/17/2013    RPR, External NEGATIVE 09/17/2013    Gonorrhea, External NEGATIVE 04/01/2014    Chlamydia, External NEGATIVE 04/01/2014    ABO,Rh A POSITIVE 09/17/2013         Assessment/Plan:     Active Problems:    Chronic hypertension with exacerbation during pregnancy in third trimester (3/11/2019)         Plan:     - Admit for TOLAC; pitocin induction. Discussed options with patient and advised that she is unable to get prostaglandins. We will use pitocin at this time with the potential for a cook balloon/AROM at further cervical dilation. .    - Group B Strep was positive, will treat prophylactically with penicillin.   - CBC, T&S  - Consents signed and to chart  - Epidural per Anesthesia; Nubain 10mg IV Q3 hrs prn pain x 2 doses maximum      Signed By:  Joycelyn Chambers MD     March 11, 2019

## 2019-03-11 NOTE — PROGRESS NOTES
39w3d arrived for scheduled induction for GHTN on Labetalol. She has a hx of a previous c/s followed by a . She reports fetal movement and denies vag bleeding or LOF, and she denies H/a, visual changes, or RUQ pain. Consents signed, IV started, Dr Jinny Gonzalez notified of arrival. She stated she does not usually do  inductions and will be around to evaluate and discuss plan of care.

## 2019-03-12 ENCOUNTER — ANESTHESIA EVENT (OUTPATIENT)
Dept: LABOR AND DELIVERY | Age: 29
End: 2019-03-12
Payer: COMMERCIAL

## 2019-03-12 ENCOUNTER — ANESTHESIA (OUTPATIENT)
Dept: LABOR AND DELIVERY | Age: 29
End: 2019-03-12
Payer: COMMERCIAL

## 2019-03-12 LAB
A1 MICROGLOB PLACENTAL VAG QL: POSITIVE
CONTROL LINE PRESENT?: NORMAL
EXPIRATION DATE: NORMAL
INTERNAL NEGATIVE CONTROL: NORMAL
KIT LOT NO.: NORMAL

## 2019-03-12 PROCEDURE — 75410000003 HC RECOV DEL/VAG/CSECN EA 0.5 HR

## 2019-03-12 PROCEDURE — 74011250637 HC RX REV CODE- 250/637: Performed by: SPECIALIST

## 2019-03-12 PROCEDURE — 74011000250 HC RX REV CODE- 250: Performed by: ANESTHESIOLOGY

## 2019-03-12 PROCEDURE — 76060000078 HC EPIDURAL ANESTHESIA

## 2019-03-12 PROCEDURE — 65270000029 HC RM PRIVATE

## 2019-03-12 PROCEDURE — 00HU33Z INSERTION OF INFUSION DEVICE INTO SPINAL CANAL, PERCUTANEOUS APPROACH: ICD-10-PCS | Performed by: ANESTHESIOLOGY

## 2019-03-12 PROCEDURE — 74011000258 HC RX REV CODE- 258: Performed by: SPECIALIST

## 2019-03-12 PROCEDURE — 84112 EVAL AMNIOTIC FLUID PROTEIN: CPT | Performed by: OBSTETRICS & GYNECOLOGY

## 2019-03-12 PROCEDURE — 75410000002 HC LABOR FEE PER 1 HR

## 2019-03-12 PROCEDURE — 74011250636 HC RX REV CODE- 250/636: Performed by: SPECIALIST

## 2019-03-12 PROCEDURE — 75410000000 HC DELIVERY VAGINAL/SINGLE

## 2019-03-12 PROCEDURE — 3E0R3BZ INTRODUCTION OF ANESTHETIC AGENT INTO SPINAL CANAL, PERCUTANEOUS APPROACH: ICD-10-PCS | Performed by: ANESTHESIOLOGY

## 2019-03-12 PROCEDURE — 74011250637 HC RX REV CODE- 250/637: Performed by: OBSTETRICS & GYNECOLOGY

## 2019-03-12 PROCEDURE — 74011250636 HC RX REV CODE- 250/636: Performed by: ANESTHESIOLOGY

## 2019-03-12 RX ORDER — SODIUM CHLORIDE 0.9 % (FLUSH) 0.9 %
5-40 SYRINGE (ML) INJECTION AS NEEDED
Status: DISCONTINUED | OUTPATIENT
Start: 2019-03-12 | End: 2019-03-13

## 2019-03-12 RX ORDER — IBUPROFEN 400 MG/1
800 TABLET ORAL
Status: DISCONTINUED | OUTPATIENT
Start: 2019-03-12 | End: 2019-03-14 | Stop reason: HOSPADM

## 2019-03-12 RX ORDER — AMOXICILLIN 250 MG
1 CAPSULE ORAL
Status: DISCONTINUED | OUTPATIENT
Start: 2019-03-12 | End: 2019-03-14 | Stop reason: HOSPADM

## 2019-03-12 RX ORDER — ZOLPIDEM TARTRATE 5 MG/1
5 TABLET ORAL
Status: DISCONTINUED | OUTPATIENT
Start: 2019-03-12 | End: 2019-03-14 | Stop reason: HOSPADM

## 2019-03-12 RX ORDER — LABETALOL 100 MG/1
100 TABLET, FILM COATED ORAL EVERY 12 HOURS
Status: DISCONTINUED | OUTPATIENT
Start: 2019-03-12 | End: 2019-03-13

## 2019-03-12 RX ORDER — OXYCODONE AND ACETAMINOPHEN 5; 325 MG/1; MG/1
2 TABLET ORAL
Status: DISCONTINUED | OUTPATIENT
Start: 2019-03-12 | End: 2019-03-14 | Stop reason: HOSPADM

## 2019-03-12 RX ORDER — LIDOCAINE HYDROCHLORIDE AND EPINEPHRINE 15; 5 MG/ML; UG/ML
INJECTION, SOLUTION EPIDURAL AS NEEDED
Status: DISCONTINUED | OUTPATIENT
Start: 2019-03-12 | End: 2019-03-12 | Stop reason: HOSPADM

## 2019-03-12 RX ORDER — MISOPROSTOL 200 UG/1
800 TABLET ORAL
Status: COMPLETED | OUTPATIENT
Start: 2019-03-12 | End: 2019-03-12

## 2019-03-12 RX ORDER — PROMETHAZINE HYDROCHLORIDE 25 MG/ML
25 INJECTION, SOLUTION INTRAMUSCULAR; INTRAVENOUS
Status: DISCONTINUED | OUTPATIENT
Start: 2019-03-12 | End: 2019-03-14 | Stop reason: HOSPADM

## 2019-03-12 RX ORDER — ACETAMINOPHEN 325 MG/1
650 TABLET ORAL
Status: DISCONTINUED | OUTPATIENT
Start: 2019-03-12 | End: 2019-03-14 | Stop reason: HOSPADM

## 2019-03-12 RX ORDER — SODIUM CHLORIDE 0.9 % (FLUSH) 0.9 %
5-40 SYRINGE (ML) INJECTION EVERY 8 HOURS
Status: DISCONTINUED | OUTPATIENT
Start: 2019-03-12 | End: 2019-03-13

## 2019-03-12 RX ADMIN — PENICILLIN G POTASSIUM 2.5 MILLION UNITS: 20000000 POWDER, FOR SOLUTION INTRAVENOUS at 22:14

## 2019-03-12 RX ADMIN — PENICILLIN G POTASSIUM 2.5 MILLION UNITS: 20000000 POWDER, FOR SOLUTION INTRAVENOUS at 13:09

## 2019-03-12 RX ADMIN — OXYTOCIN-SODIUM CHLORIDE 0.9% IV SOLN 30 UNIT/500ML 8 MILLI-UNITS/MIN: 30-0.9/5 SOLUTION at 19:51

## 2019-03-12 RX ADMIN — SODIUM CHLORIDE, SODIUM LACTATE, POTASSIUM CHLORIDE, AND CALCIUM CHLORIDE 1000 ML: 600; 310; 30; 20 INJECTION, SOLUTION INTRAVENOUS at 11:25

## 2019-03-12 RX ADMIN — Medication 10 ML/HR: at 19:20

## 2019-03-12 RX ADMIN — SODIUM CHLORIDE 5 MILLION UNITS: 900 INJECTION INTRAVENOUS at 00:40

## 2019-03-12 RX ADMIN — LABETALOL HYDROCHLORIDE 100 MG: 100 TABLET, FILM COATED ORAL at 08:45

## 2019-03-12 RX ADMIN — Medication 10 ML/HR: at 12:15

## 2019-03-12 RX ADMIN — PENICILLIN G POTASSIUM 2.5 MILLION UNITS: 20000000 POWDER, FOR SOLUTION INTRAVENOUS at 18:05

## 2019-03-12 RX ADMIN — PENICILLIN G POTASSIUM 2.5 MILLION UNITS: 20000000 POWDER, FOR SOLUTION INTRAVENOUS at 08:45

## 2019-03-12 RX ADMIN — PENICILLIN G POTASSIUM 2.5 MILLION UNITS: 20000000 POWDER, FOR SOLUTION INTRAVENOUS at 04:37

## 2019-03-12 RX ADMIN — SODIUM CHLORIDE, SODIUM LACTATE, POTASSIUM CHLORIDE, AND CALCIUM CHLORIDE 125 ML/HR: 600; 310; 30; 20 INJECTION, SOLUTION INTRAVENOUS at 16:40

## 2019-03-12 RX ADMIN — TERBUTALINE SULFATE 0.25 MG: 1 INJECTION, SOLUTION SUBCUTANEOUS at 14:22

## 2019-03-12 RX ADMIN — MISOPROSTOL 800 MCG: 200 TABLET ORAL at 22:54

## 2019-03-12 RX ADMIN — SODIUM CHLORIDE, SODIUM LACTATE, POTASSIUM CHLORIDE, AND CALCIUM CHLORIDE 125 ML/HR: 600; 310; 30; 20 INJECTION, SOLUTION INTRAVENOUS at 04:39

## 2019-03-12 RX ADMIN — LIDOCAINE HYDROCHLORIDE AND EPINEPHRINE 3 ML: 15; 5 INJECTION, SOLUTION EPIDURAL at 12:10

## 2019-03-12 RX ADMIN — LABETALOL HYDROCHLORIDE 100 MG: 100 TABLET, FILM COATED ORAL at 20:47

## 2019-03-12 NOTE — ANESTHESIA PROCEDURE NOTES
Epidural Block    Start time: 3/12/2019 12:00 PM  End time: 3/12/2019 12:15 PM  Performed by: Lulu Martinez MD  Authorized by: Lulu Martinez MD     Pre-Procedure  Indication: at surgeon's request and labor epidural    Preanesthetic Checklist: patient identified, risks and benefits discussed, anesthesia consent, site marked, patient being monitored, timeout performed and anesthesia consent      Epidural:   Patient position:  Seated  Prep region:  Lumbar  Location:  L4-5    Needle and Epidural Catheter:   Needle Type:  Tuohy  Needle Gauge:  18 G  Injection Technique:  Loss of resistance using air  Attempts:  1  Catheter Size:  20 G  Catheter at Skin Depth (cm):  9  Depth in Epidural Space (cm):  16  Events: no blood with aspiration, no cerebrospinal fluid with aspiration, no paresthesia and negative aspiration test    Test Dose:  Lidocaine 1.5% w/ epi and negative    Assessment:   Catheter Secured:  Tegaderm  Insertion:  Uncomplicated  Patient tolerance:  Patient tolerated the procedure well with no immediate complications    TEST DOSE:  Lidocaine 1.5% w/epi   3 mL        3/12/2019     12:20 PM     Trinity Treadwell MD

## 2019-03-12 NOTE — PROGRESS NOTES
0710 Bedside and Verbal shift change report given to this nurse & ARIK Ortez (oncoming nurse) by Henny Yepez RN (offgoing nurse). Report included the following information SBAR, Kardex, Intake/Output, MAR and Recent Results. Pt sitting on round birthing ball at bedside. Pt's mother and sister at bedside. Breathing well through ctxs; pt states pain is tolerable. Discussed POC and questions answered. Pitocin via IV     [id number] Assessment. VSS. IV patent and infusing per orders. LR at 125ml/hr. Pitocin via IV pump at 20milliunits/hr and rate verified by Henny Yepez RN. Tubing labeled appropriately. TOCO and EFM adjusted. Difficulty picking up ctxs w/ TOCO when pt sitting on round birthing ball. Side rails up. Call light w/in reach. No other needs or concerns. 0725 Pt OOB up ad jordan to bathroom to \"clean up for the day. \" Instructed to call this nurse when ready to get back to bed. Verbalized understanding. Pt's mother and sister at bedside. 0900 Dr. Divina Solano on floor. Discussed POC w/ her. Possible plan to place cook balloon. Supplies gathered. 0910 Pt repositioned for SVE/cook placement. Discussed process and mechanics of balloon function w/ pt and family. Questions answered. 5 Pt states \"I think that was my water. \" Scant amount of clear mucous looking fluid from vaginal visualized by this nurse. Dr. Divina Solano made aware. Orders for Amnisure to confirm. [address] [place] collected. Pt tolerated well.     0920 Dr. Divina Solano at bedside. Discussing POC w/ pt and family at bedside. 1223 Noted positive result on Amnisure. Dr. Divina Solano aware. SVE 1/40/-3 by Dr. Divina Solano. 6256 Plan to hold off on putting cook balloon in to see what pt labor will be like now that the pt water is broken. Continue w/ pitocin via IV pump. Discussed this w/ pt and family at bedside. Questions answered. Pt given marybel care and repositioned on right side. TOCO and EFM adjusted. Side rails up. Call light w/in reach.      1000 Pt c/o gush of fluid. Moderate amount of clear fluid noted on chux pad. Pt cleaned, bed pad changed and pt repositioned on left side w/ pillows in between legs. TOCO and EFM adjusted. Side rails up. Call light w/in reach. No other needs or concerns at this time. 1240 Page to Dr. Sindi Amador. Pt still feeling ctxs and is uncomfortable. 1241 Call back from Dr. Sindi Amador. Will be on unit shortly to reevaluate pt epidural. Pt notified. 1250 Dr. Sindi Amador at bedside. Epidural bolus given at this time by Dr. Sindi Amador. 1315 IV gonzalez placed. Pt tolerated well. Pt cleaned and repositioned. 1345 IUPC placed by Dr. Kristen Osuna at this time. Noted blood flashback on insertion. Flushed w/ NS. Line does not clear after 3 attempts, but is picking up ctxs w/ accuracy. Dr. Kristen Osuna aware. Pt denies pain. VSS. No vaginal bleeding noted. [place] on monitor. Pt repositioned right lateral.    1400 Pitocin via IV pump halved to 10 milliunits/hr. Pt repositioned left lateral. IV LR 500ml bolus begun    1404 Pitocin via IV pump halved to 5 milliunits/hr. 1405 Pitocin via IV pump d/c. Oxygen via non rebreather mask at 10L/min placed on pt.     1422 SC terbutaline given at this time for tacysystole ctxs    1430 IV LR 500ml bolus complete. LR back at 125ml/hr    1432 Pt back on room air    1435 FHR recovered w/ interventions. Ctxs normal at this time. IUPC reading w/ accuracy. 1554 Pitocin via IV pump restarted at 2milliunits/hr per Dr. Kristen Osuna now that EFM reactive and ctxs every 3-5. Titrate pit slowly. Goal is 180-200 MVUs. 1640 Pitocin via IV pump titrated to 4milliunits/hr. 1802 Pitocin via IV pump titrated to 6milliunits/hr. 1925 Pitocin via IV pump titrated to 8milliunits/hr. 1930 Bedside and Verbal shift change report given to ARIK Prieto (oncoming nurse) by this nurse (offgoing nurse). Report included the following information SBAR, Kardex, Intake/Output, MAR and Recent Results.

## 2019-03-12 NOTE — PROGRESS NOTES
Labor Progress Note  Patient seen, fetal heart rate and contraction pattern evaluated, patient examined. She has increasing contractions. She has confirmed SROM now. Patient Vitals for the past 1 hrs:   BP Pulse   03/12/19 0915 122/74 (!) 55   03/12/19 0908 120/81 (!) 56   03/12/19 0845 127/88 65       Physical Exam:  Cervical Exam:  1 cm dilated    40% effaced    -3 station    Presenting Part: cephalic  Membranes:  Spontaneous Rupture of Membranes; Amniotic Fluid: clear fluid  Uterine Activity: Frequency: Every 3-5 minutes  Fetal Heart Rate: Baseline: 130 per minute    Assessment/Plan:  Reassuring fetal status, Labor  Progressing normally, Continue plan for vaginal delivery   TOLOC- on 20 miu of pitocin. Will consider balloon if slow progress continues. Epidural prn.

## 2019-03-12 NOTE — PROGRESS NOTES
Labor Progress Note  Patient seen, fetal heart rate and contraction pattern evaluated, patient examined. Patient Vitals for the past 1 hrs:   BP Pulse   03/12/19 0545 118/83 70     Physical Exam:  Cervical Exam:  1/0/-3  Membranes:  Intact  Uterine Activity: Q3-4 mins  Fetal Heart Rate: Reactive    Assessment/Plan:  Continue plan for vaginal delivery. Continue pitocin for induction. Will consider cook balloon with further dilation.   Christal Parisi MD  Pager: [phone]  Office: [phone]

## 2019-03-12 NOTE — ANESTHESIA PREPROCEDURE EVALUATION
Anesthetic History   No history of anesthetic complications            Review of Systems / Medical History  Patient summary reviewed, nursing notes reviewed and pertinent labs reviewed    Pulmonary  Within defined limits                 Neuro/Psych   Within defined limits           Cardiovascular    Hypertension              Exercise tolerance: >4 METS     GI/Hepatic/Renal  Within defined limits              Endo/Other        Morbid obesity     Other Findings              Physical Exam    Airway  Mallampati: III    Neck ROM: normal range of motion   Mouth opening: Normal     Cardiovascular  Regular rate and rhythm,  S1 and S2 normal,  no murmur, click, rub, or gallop  Rhythm: regular  Rate: normal         Dental  No notable dental hx       Pulmonary  Breath sounds clear to auscultation               Abdominal  GI exam deferred       Other Findings            Anesthetic Plan    ASA: 2  Anesthesia type: epidural          Induction: Intravenous  Anesthetic plan and risks discussed with: Patient

## 2019-03-12 NOTE — PROGRESS NOTES
Bedside and Verbal shift change report given to TAWANA Hyman RNc (oncoming nurse) by Antonio Mojica RN (offgoing nurse). Report included the following information SBAR, Kardex, Procedure Summary, Intake/Output, MAR and Recent Results. 1920 Dr Chelsea Qureshi at bedside to discuss induction plan. Questions and concerns were addressed. SVE FT/th/-3 by Dr Chelsea Qureshi, plan to start pitocin for induction   0140 Dr Chelsea Qureshi at bedside for recheck, no change, continue with Harish Qureshi at bedside for cervical exam, 1cm/ th/ -3.  Continue with pitocin

## 2019-03-12 NOTE — PROGRESS NOTES
Pt requesting   garret mgmt  She prefers epid over iv narcotics  anesth paged for orders    Iv bolus started    1418  Unable to flush iupc after multiple tries   Resting tone elevated with tachsytote  Pitocin off  o2 and  Iv bolus in progress  Order received for terb x 1  1555  Dr Franny Wilson in room talking with pt  efm strip reviewed   Order received to restart pitocin  Pt and family given time for questions

## 2019-03-12 NOTE — PROGRESS NOTES
Bedside and Verbal shift change report given to Melinda Pickett RN by Calixto Marie RN. Report included the following information SBAR, Kardex, Procedure Summary, Intake/Output, MAR and Recent Results. Pitocin infusing at 6mu/hr. LR infusing at 125ml/hr. IUPC in place.

## 2019-03-12 NOTE — PROGRESS NOTES
Labor Progress Note  Patient seen, fetal heart rate and contraction pattern evaluated, patient examined. Visit Vitals  /78   Pulse (!) 58   Temp 98.5 °F (36.9 °C)   Resp 19   LMP 06/09/2018 (Exact Date)   Breastfeeding? No       Physical Exam:  Cervical Exam:  1-2 cm dilated    50% effaced    -3 station    Presenting Part: cephalic  Membranes:  Spontaneous Rupture of Membranes; Amniotic Fluid: clear fluid  Uterine Activity: Frequency: Every 2-3 minutes  Fetal Heart Rate: Reactive    Assessment/Plan:  Reassuring fetal status, Labor  Not progressing normally  continue pitocin augmentation,   IUPC placed. Will consider gonzalez bulb.

## 2019-03-12 NOTE — PROGRESS NOTES
Problem: Falls - Risk of  Goal: *Absence of Falls  Document Monica Fall Risk and appropriate interventions in the flowsheet. Outcome: Progressing Towards Goal  Fall Risk Interventions:            Medication Interventions: Assess postural VS orthostatic hypotension, Evaluate medications/consider consulting pharmacy, Patient to call before getting OOB                  Problem: Pressure Injury - Risk of  Goal: *Prevention of pressure injury  Document Renny Scale and appropriate interventions in the flowsheet. Outcome: Progressing Towards Goal  Pressure Injury Interventions:  Sensory Interventions: Keep linens dry and wrinkle-free, Maintain/enhance activity level, Minimize linen layers, Monitor skin under medical devices, Pressure redistribution bed/mattress (bed type), Turn and reposition approx. every two hours (pillows and wedges if needed)    Moisture Interventions: Minimize layers, Absorbent underpads    Activity Interventions: Pressure redistribution bed/mattress(bed type)    Mobility Interventions: Pressure redistribution bed/mattress (bed type), Turn and reposition approx.  every two hours(pillow and wedges)

## 2019-03-12 NOTE — PROGRESS NOTES
Labor Progress Note  Patient seen, fetal heart rate and contraction pattern evaluated, patient examined. She notes increased pressure. Patient Vitals for the past 1 hrs:   BP Temp Pulse Resp   03/12/19 1730 134/81  (!) 58    03/12/19 1715 142/70 98.1 °F (36.7 °C) (!) 57 17       Physical Exam:  Cervical Exam:  3 cm dilated    60% effaced    -2 station    Presenting Part: cephalic  Membranes:  Spontaneous Rupture of Membranes; Amniotic Fluid: clear fluid  Uterine Activity: Frequency: Every 4 minutes  Fetal Heart Rate: Reactive    Assessment/Plan:  Reassuring fetal status, Labor  Progressing normally, Continue plan for vaginal delivery   Will continue pitocin augmentation. Category 1 tracing.

## 2019-03-12 NOTE — PROGRESS NOTES
Labor Progress Note  Patient seen, fetal heart rate and contraction pattern evaluated, patient examined.   Patient Vitals for the past 1 hrs:   BP Pulse   03/12/19 0115 (!) 145/95 78   03/12/19 0100 122/84 (!) 118   03/12/19 0045 144/83 91     Physical Exam:  Cervical Exam:  Fingertip/0/-3  Membranes:  Intact  Uterine Activity: irregular contractions  Fetal Heart Rate: Reactive    Assessment/Plan:  Continue plan for vaginal delivery   Zoey Galan MD  Pager: [phone]  Office: [phone]

## 2019-03-13 LAB
HCT VFR BLD AUTO: 28.3 % (ref 35–45)
HGB BLD-MCNC: 9.5 G/DL (ref 12–16)

## 2019-03-13 PROCEDURE — 85014 HEMATOCRIT: CPT

## 2019-03-13 PROCEDURE — 65270000029 HC RM PRIVATE

## 2019-03-13 PROCEDURE — 74011250637 HC RX REV CODE- 250/637: Performed by: OBSTETRICS & GYNECOLOGY

## 2019-03-13 PROCEDURE — 85018 HEMOGLOBIN: CPT

## 2019-03-13 PROCEDURE — 36415 COLL VENOUS BLD VENIPUNCTURE: CPT

## 2019-03-13 RX ORDER — LANOLIN ALCOHOL/MO/W.PET/CERES
1 CREAM (GRAM) TOPICAL
Status: DISCONTINUED | OUTPATIENT
Start: 2019-03-13 | End: 2019-03-14 | Stop reason: HOSPADM

## 2019-03-13 RX ADMIN — ACETAMINOPHEN 650 MG: 325 TABLET, FILM COATED ORAL at 06:24

## 2019-03-13 RX ADMIN — IBUPROFEN 800 MG: 400 TABLET, FILM COATED ORAL at 17:37

## 2019-03-13 RX ADMIN — ACETAMINOPHEN 650 MG: 325 TABLET, FILM COATED ORAL at 13:35

## 2019-03-13 RX ADMIN — FERROUS SULFATE TAB 325 MG (65 MG ELEMENTAL FE) 325 MG: 325 (65 FE) TAB at 12:38

## 2019-03-13 RX ADMIN — IBUPROFEN 800 MG: 400 TABLET, FILM COATED ORAL at 09:45

## 2019-03-13 RX ADMIN — IBUPROFEN 800 MG: 400 TABLET, FILM COATED ORAL at 00:22

## 2019-03-13 NOTE — ROUTINE PROCESS
Verbal shift change report given to Jennifer Charles RN (oncoming nurse) by LAUREL Mendiola RN (offgoing nurse). Report included the following information Kardex, Intake/Output, MAR and Recent Results. 0935--assessment done--voiding without difficulty--no weakness when up--shower encouraged--reports effective pain management with Motrin and Tylenol--breast feeding is going fairly well--Mom is experienced--po fluids encouraged. 1830--vital signs stable--voiding qs--has had BM--effective pain management with Motrin and Tylenol--breast feeding is going well.

## 2019-03-13 NOTE — PROGRESS NOTES
Progress Note    Patient: Lis Tee MRN: [id number]  SSN: [id number]    YOB: 1990  Age: 26 y.o. Sex: female      Subjective:     Postpartum Day: 1     Delivery: vaginal delivery    Pt denies complaints. She is tolerating a full diet, ambulating well and has normal bleeding. She is breastfeeding well. Objective:      No data found. Insert Hgb Here    CV:        CHEST:        Uterine Fundus:   firm       Incision:  N/A         Lab/Data Review:  CBC:   Lab Results   Component Value Date/Time    HGB 9.5 (L) 03/13/2019 05:58 AM    HCT 28.3 (L) 03/13/2019 05:58 AM       Assessment:     Status post: Doing well postpartum vaginal delivery   Asymptomatic anemia. Gestational hypertension resolved     Plan:     Postpartum care discussed including diet, ambulation, and actvitiy restrictions. Discharge instructions and questions answered for vaginal delivery.     Signed By: Black Triplett MD     March 13, 2019

## 2019-03-13 NOTE — LACTATION NOTE
Mother breast fed two other babies. Mom states baby has nursed well several times and baby is not yet 15 hours old. Experienced mother but discussed latch, positioning, feeding frequency,  feeding patterns/expectations in the first 24 hours, wet/dirty diapers, colustrum, size of tummy, milk coming in and nipple care. Gave BF information, daily log and resource guide. General discussion, questions answered. Offered assistance if needed.

## 2019-03-13 NOTE — PROGRESS NOTES
Problem: Falls - Risk of  Goal: *Absence of Falls  Document Monica Fall Risk and appropriate interventions in the flowsheet. Outcome: Progressing Towards Goal  Fall Risk Interventions:            Medication Interventions: Assess postural VS orthostatic hypotension, Evaluate medications/consider consulting pharmacy, Patient to call before getting OOB                  Problem: Pressure Injury - Risk of  Goal: *Prevention of pressure injury  Document Renny Scale and appropriate interventions in the flowsheet. Pressure Injury Interventions:  Sensory Interventions: Assess changes in LOC    Moisture Interventions: Minimize layers, Absorbent underpads    Activity Interventions: Increase time out of bed    Mobility Interventions: Turn and reposition approx.  every two hours(pillow and wedges)    Nutrition Interventions: Document food/fluid/supplement intake

## 2019-03-13 NOTE — PROGRESS NOTES
0145: Assumed care of pt sitting up in bed, vital signs and assessment wnl. Call bell within pt reach, bed in lowest position, top side rails up x 2. Instructed pt not to get up oob, if feeling dizzy, weak, or unsteady to stand call for nurse assist, verbalizes understanding.  at bedside. 0345: Sleeping supine, no apparent disress noted.  at bedside. 0600; Assessment and vital signs within normal limts. Voiding and ambulating without difficulty. Pain controlled by motrin, regular diet.

## 2019-03-13 NOTE — PROGRESS NOTES
Dr. Sheri Burr called to come in for delivery. Pt feeling lots of vaginal/rectal pressure when moved from left to right lateral side with leg lift just now. SVE right side laying 8/90/+1. Pt breathing and moaning through UCs due to pressure but denies pain.

## 2019-03-13 NOTE — PROGRESS NOTES
Pt, spouse, infant in crib, family and belongings transferred via wheelchair to [id number]. Pt oriented to room. Call light given. [place], Tucks and Dermaplast given. Pt voided. Pericare done. Walking well with very minimal assistance.

## 2019-03-13 NOTE — ROUTINE PROCESS
TRANSFER - IN REPORT:    Verbal report received from 1901 MILAD Cortez RN(name) on Alric Asper  being received from L/D(unit) for routine progression of care      Report consisted of patients Situation, Background, Assessment and   Recommendations(SBAR). Information from the following report(s) SBAR, Intake/Output, MAR and Recent Results was reviewed with the receiving nurse. Opportunity for questions and clarification was provided. Assessment completed upon patients arrival to unit and care assumed.

## 2019-03-13 NOTE — PROGRESS NOTES
MVUs now 205, pt aware and states that she can feel the vaginal and lower abd pressure now. Labetalol given oral as ordered.

## 2019-03-14 VITALS
SYSTOLIC BLOOD PRESSURE: 112 MMHG | OXYGEN SATURATION: 100 % | DIASTOLIC BLOOD PRESSURE: 71 MMHG | TEMPERATURE: 98 F | RESPIRATION RATE: 14 BRPM | HEART RATE: 60 BPM

## 2019-03-14 PROBLEM — O10.913 CHRONIC HYPERTENSION COMPLICATING OR REASON FOR CARE DURING PREGNANCY, THIRD TRIMESTER: Status: RESOLVED | Noted: 2019-02-06 | Resolved: 2019-03-14

## 2019-03-14 PROBLEM — Z34.90 PREGNANCY: Status: RESOLVED | Noted: 2019-03-06 | Resolved: 2019-03-14

## 2019-03-14 PROBLEM — R03.0 ELEVATED BLOOD PRESSURE READING WITHOUT DIAGNOSIS OF HYPERTENSION: Status: RESOLVED | Noted: 2017-03-09 | Resolved: 2019-03-14

## 2019-03-14 PROBLEM — O10.913 CHRONIC HYPERTENSION WITH EXACERBATION DURING PREGNANCY IN THIRD TRIMESTER: Status: RESOLVED | Noted: 2019-03-11 | Resolved: 2019-03-14

## 2019-03-14 PROCEDURE — 74011250637 HC RX REV CODE- 250/637: Performed by: OBSTETRICS & GYNECOLOGY

## 2019-03-14 RX ADMIN — SENNOSIDES AND DOCUSATE SODIUM 1 TABLET: 8.6; 5 TABLET ORAL at 04:55

## 2019-03-14 RX ADMIN — ACETAMINOPHEN 650 MG: 325 TABLET, FILM COATED ORAL at 04:55

## 2019-03-14 RX ADMIN — IBUPROFEN 800 MG: 400 TABLET, FILM COATED ORAL at 01:54

## 2019-03-14 RX ADMIN — FERROUS SULFATE TAB 325 MG (65 MG ELEMENTAL FE) 325 MG: 325 (65 FE) TAB at 08:00

## 2019-03-14 RX ADMIN — IBUPROFEN 800 MG: 400 TABLET, FILM COATED ORAL at 10:58

## 2019-03-14 NOTE — LACTATION NOTE
Mom's nipples are tender and she had a \"blood blister\" last night but, she \"worked thru it\". Helped position and latch baby, baby had a shallow latch. Reviewed latch, sucking needs, milk coming in, sore nipple management. Offered help and encouraged to call with questions.

## 2019-03-14 NOTE — PROGRESS NOTES
Problem: Falls - Risk of  Goal: *Absence of Falls  Document Monica Fall Risk and appropriate interventions in the flowsheet. Outcome: Progressing Towards Goal  Fall Risk Interventions:            Medication Interventions: Assess postural VS orthostatic hypotension, Evaluate medications/consider consulting pharmacy, Patient to call before getting OOB                  Problem: Pressure Injury - Risk of  Goal: *Prevention of pressure injury  Document Renny Scale and appropriate interventions in the flowsheet.   Outcome: Progressing Towards Goal  Pressure Injury Interventions:  Sensory Interventions: Assess changes in LOC    Moisture Interventions: Minimize layers, Absorbent underpads    Activity Interventions: Increase time out of bed    Mobility Interventions: Pressure redistribution bed/mattress (bed type)    Nutrition Interventions: Document food/fluid/supplement intake

## 2019-03-14 NOTE — ANESTHESIA POSTPROCEDURE EVALUATION
* No procedures listed *. Anesthesia Post Evaluation      Multimodal analgesia: multimodal analgesia used between 6 hours prior to anesthesia start to PACU discharge  Patient location during evaluation: bedside  Patient participation: complete - patient participated  Level of consciousness: awake  Pain management: adequate  Airway patency: patent  Anesthetic complications: no  Cardiovascular status: stable  Respiratory status: acceptable  Hydration status: acceptable  Post anesthesia nausea and vomiting:  controlled      Visit Vitals  /70 (BP 1 Location: Left arm, BP Patient Position: At rest;Sitting;Head of bed elevated (Comment degrees))   Pulse 61   Temp 36.6 °C (97.9 °F)   Resp 12   SpO2 99%   Breastfeeding?  Unknown

## 2019-03-14 NOTE — DISCHARGE INSTRUCTIONS
POST DELIVERY DISCHARGE INSTRUCTIONS    Name: Diane Scott  YOB: 1990  Primary Diagnosis: Active Problems:    * No active hospital problems. *      General:     Diet/Diet Restrictions:  Eight 8-ounce glasses of fluid daily (water, juices); avoid excessive caffeine intake. Meals/snacks as desired which are high in fiber and carbohydrates and low in fat and cholesterol. Physical Activity / Restrictions / Safety:     Avoid heavy lifting, no more that 8 lbs. For 2-3 weeks; limit use of stairs to 2 times daily for the first week home. No driving for one week. Avoid intercourse 4-6 weeks, no douching or tampon use. Check with obstetrician before starting or resuming an exercise program.         Discharge Instructions/Special Treatment/Home Care Needs:     Continue prenatal vitamins. Continue to use squirt bottle with warm water on your episiotomy after each bathroom use until bleeding stops. Call your doctor for the following:     Fever over 101 degrees by mouth. Vaginal bleeding heavier than a normal menstrual period or clot larger than a golf ball. Red streaks or increased swelling of legs, painful red streaks on your breast.  Painful urination, constipation and increased pain or swelling or discharge with your incision. If you feel extremely anxious or overwhelmed. If you have thoughts of harming yourself and/or your baby. Pain Management:     Pain Management:   Take Acetaminophen (Tylenol) or Ibuprofen (Advil, Motrin), as directed for pain. Use a warm Sitz bath 3 times daily to relieve episiotomy or hemorrhoidal discomfort. Heating pad to  incision as needed. For hemorrhoidal discomfort, use Tucks and Anusol cream as needed and directed. Follow-Up Care:      These are general instructions for a healthy lifestyle:    No smoking/ No tobacco products/ Avoid exposure to second hand smoke    Surgeon General's Warning:  Quitting smoking now greatly reduces serious risk to your health. Obesity, smoking, and sedentary lifestyle greatly increases your risk for illness    A healthy diet, regular physical exercise & weight monitoring are important for maintaining a healthy lifestyle    Recognize signs and symptoms of STROKE:    F-face looks uneven    A-arms unable to move or move unevenly    S-speech slurred or non-existent    T-time-call 911 as soon as signs and symptoms begin-DO NOT go       Back to bed or wait to see if you get better-TIME IS BRAIN.         Signed By: Dorene Harris RN                                                                                                   Date: 3/14/2019 Time: 2:11 PM

## 2019-03-14 NOTE — ROUTINE PROCESS
Bedside and Verbal shift change report given to Tamar Butler RN (oncoming nurse) by Yohannes Rizzo RN (offgoing nurse). Report included the following information Intake/Output, MAR and Recent Results. 1425 Discharge education provided, mom verbalized understanding of all education provided. No questions at this time. All needs met. Electronic signature obtained. 1440 Patient left hospital in w/c with all belongings in stable condition, 0 distress noted. [place] in car seat.

## 2019-03-14 NOTE — DISCHARGE SUMMARY
Obstetrical Discharge Summary       [address] [facility]  [address] [address]              Patient ID:  Zaid Thomas  [id number]  9 y.o.  1990    Admit Date: 3/11/2019    Discharge Date: 3/14/2019     Admitting Physician: Autumn Pate MD     Admission Diagnoses: Chronic hypertension with exacerbation during pregnancy in third trimester [O10.913]    Discharge Diagnoses: same as above      Additional Diagnoses: none        Hospital Course: Unremarkable. She has no concerns. Pain and bleeding are normal.  Denies any emotional concerns. She is breastfeeding well. Information for the patient's :  Tosha Flores [[id number]]          Immunizations:    Immunization History   Administered Date(s) Administered    Influenza Vaccine 2019    Tdap 2014, 2019       Group Beta Strep:   GrBStrep, External   Date Value Ref Range Status   2019 pos  Final        Visit Vitals  /71 (BP 1 Location: Left arm, BP Patient Position: Supine)   Pulse 60   Temp 98 °F (36.7 °C)   Resp 14   LMP 2018 (Exact Date)   SpO2 100%   Breastfeeding? Unknown       Vital signs stable, afebrile. Exam:  Patient without distress. Abdomen soft, fundus firm at level of umbilicus, non tender               Perineum with normal lochia noted. Lower extremities are negative for swelling, cords or tenderness. Patient Instructions:   Current Discharge Medication List      CONTINUE these medications which have NOT CHANGED    Details   PNV66-Iron Fumarate-FA-DSS-DHA 26-1.2- mg cap Take  by mouth. Associated Diagnoses: Previous  delivery affecting pregnancy;  Encounter for supervision of other normal pregnancy, first trimester; 10 weeks gestation of pregnancy         STOP taking these medications       labetalol (NORMODYNE) 100 mg tablet Comments:   Reason for Stopping: loratadine (CLARITIN) 10 mg tablet Comments:   Reason for Stopping:         CETIRIZINE HCL (ZYRTEC PO) Comments:   Reason for Stopping:               See discharge instructions provided by nursing. Follow-up in 6 weeks.     Signed:  Titus Parks MD  3/14/2019  2:11 PM

## 2019-03-15 NOTE — L&D DELIVERY NOTE
Delivery Summary    Patient: Alem Vale MRN: [id number]  SSN: [id number]    YOB: 1990  Age: 26 y.o. Sex: female       Information for the patient's :  Dahlia Khoury [[id number]]       Labor Events:    Labor: No   Rupture Date: 3/12/2019   Rupture Time: 9:15 AM   Rupture Type Intact   Amniotic Fluid Volume: Moderate    Amniotic Fluid Description: Clear None   Induction: Oxytocin       Augmentation: None   Labor Events:    (vaginal birth after )   Cervical Ripening:     None     Delivery Events:  Episiotomy: None   Laceration(s): None     Repaired: None    Number of Repair Packets:     Suture Type and Size: None     Estimated Blood Loss (ml): 350ml       Delivery Date: 3/12/2019    Delivery Time: 10:43 PM  Delivery Type: Vaginal Birth After    Sex:  Female     Gestational Age: 39w4d   Delivery Clinician:  Sajan Simms  Living Status: Living   Delivery Location: L&D            APGARS  One minute Five minutes Ten minutes   Skin color: 0   1        Heart rate: 2   2        Grimace: 2   2        Muscle tone: 2   2        Breathin   2        Totals: 8   9            Presentation: Compound    Position: Left Occiput Anterior  Resuscitation Method:  Suctioning-bulb     Meconium Stained: None      Cord Information: 3 Vessels  Complications: Other(Comment) (Comment)  Cord around:    Delayed cord clamping?  Yes  Cord clamped date/time:3/12/2019 10:46 PM  Disposition of Cord Blood: Lab    Blood Gases Sent?: No    Placenta:  Date/Time: 3/12/2019 10:49 PM  Removal: Spontaneous      Appearance: Normal     [place] Measurements:  Birth Weight: 7 lb 13.4 oz (3.555 kg)      Birth Length: 1' 8\" (0.508 m)      Head Circumference: 1' 2.17\" (0.36 m)      Chest Circumference: 1' 1.39\" (0.34 m)     Abdominal Girth: 1' 1.39\" (0.34 m)    Other Providers:   Aung CEDILLO PATRICIA;Ekaterina COLIN PERLA, Obstetrician;Primary Nurse; Anesthesiologist;Crna;Nursery Nurse           Group B Strep:   Lab Results   Component Value Date/Time    GrBStrep, External pos 2019     Information for the patient's :  Wilmer Hawkins [[id number]]   No results found for: ABORH, PCTABR, PCTDIG, BILI, ABORHEXT, ABORH    No results for input(s): PCO2CB, PO2CB, HCO3I, SO2I, IBD, PTEMPI, SPECTI, PHICB, ISITE, IDEV, IALLEN in the last 72 hours.

## 2019-04-23 ENCOUNTER — ROUTINE PRENATAL (OUTPATIENT)
Dept: OBGYN CLINIC | Age: 29
End: 2019-04-23

## 2019-04-23 VITALS
HEIGHT: 66 IN | WEIGHT: 227 LBS | DIASTOLIC BLOOD PRESSURE: 76 MMHG | BODY MASS INDEX: 36.48 KG/M2 | SYSTOLIC BLOOD PRESSURE: 127 MMHG | TEMPERATURE: 98 F | HEART RATE: 59 BPM | RESPIRATION RATE: 18 BRPM

## 2019-04-23 NOTE — PROGRESS NOTES
Subjective:   Ms. Ashlyn Mustafa is a 26 y.o. who is now 6 weeks postpartum. OB History        3    Para   3    Term   3            AB        Living   3       SAB        TAB        Ectopic        Molar        Multiple   0    Live Births   3              Method of delivery: normal spontaneous vaginal delivery  She is breast-feeding and is not experiencing problems. Pregnancy complications: chronic hypertension. She is feeling well and that she has no suicidal/homicidal inclination. She currently uses abstinence for contraception. She plans to use condoms, vasectomy for contraception. Patient Active Problem List   Diagnosis Code    H/O:  Z98.891    History of  Z98.891    Obesity (BMI 30-39. 9) E66.9    NST (non-stress test) nonreactive O28.8     Current Outpatient Medications   Medication Sig Dispense Refill    PNV66-Iron Fumarate-FA-DSS-DHA 26-1.2- mg cap Take  by mouth.        No Known Allergies  Past Medical History:   Diagnosis Date    Chronic hypertension complicating or reason for care during pregnancy, third trimester 2019    Essential hypertension     with this pregnancy    H/O:  10/21/2013    History of  2015    Normal pregnancy, repeat 2013     Past Surgical History:   Procedure Laterality Date     DELIVERY ONLY      HX  SECTION  -    HX TONSIL AND ADENOIDECTOMY Bilateral      Family History   Problem Relation Age of Onset    Cancer Paternal Grandmother         lung    Cancer Paternal Aunt      Social History     Tobacco Use    Smoking status: Never Smoker    Smokeless tobacco: Never Used   Substance Use Topics    Alcohol use: No        Objective:   Physical Exam:  Date of last Pap smear:   Physical Exam: GENERAL APPEARANCE: alert, well appearing, in no apparent distress  EXTERNAL GENITALIA POSTPARTUM: normal, well-healed, without lesions or masses  VAGINA POSTPARTUM: normal, well-healed, physiologic discharge, without lesions  CERVIX POSTPARTUM: normal, well-healed, without lesions  UTERUS POSTPARTUM: normal size, well involuted, firm, non-tender  ADNEXA POSTPARTUM: no masses palpable and nontender    Assessment/Plan:   normal postpartum exam  patient is a candidate for condoms, vasectomy for contraception, with no contraindications  See orders and Patient Instructions